# Patient Record
Sex: MALE | Race: WHITE | NOT HISPANIC OR LATINO | Employment: FULL TIME | URBAN - METROPOLITAN AREA
[De-identification: names, ages, dates, MRNs, and addresses within clinical notes are randomized per-mention and may not be internally consistent; named-entity substitution may affect disease eponyms.]

---

## 2019-10-29 ENCOUNTER — OFFICE VISIT (OUTPATIENT)
Dept: MEDICAL GROUP | Facility: MEDICAL CENTER | Age: 54
End: 2019-10-29
Payer: COMMERCIAL

## 2019-10-29 VITALS
BODY MASS INDEX: 33.62 KG/M2 | WEIGHT: 262 LBS | SYSTOLIC BLOOD PRESSURE: 126 MMHG | HEART RATE: 98 BPM | DIASTOLIC BLOOD PRESSURE: 68 MMHG | OXYGEN SATURATION: 94 % | TEMPERATURE: 98.2 F | RESPIRATION RATE: 16 BRPM | HEIGHT: 74 IN

## 2019-10-29 DIAGNOSIS — R60.0 LEG EDEMA, RIGHT: ICD-10-CM

## 2019-10-29 DIAGNOSIS — M25.561 CHRONIC PAIN OF RIGHT KNEE: ICD-10-CM

## 2019-10-29 DIAGNOSIS — E78.1 HYPERTRIGLYCERIDEMIA: ICD-10-CM

## 2019-10-29 DIAGNOSIS — E66.9 OBESITY (BMI 30-39.9): ICD-10-CM

## 2019-10-29 DIAGNOSIS — Z00.00 PREVENTATIVE HEALTH CARE: ICD-10-CM

## 2019-10-29 DIAGNOSIS — K21.9 GASTROESOPHAGEAL REFLUX DISEASE, ESOPHAGITIS PRESENCE NOT SPECIFIED: ICD-10-CM

## 2019-10-29 DIAGNOSIS — Z76.89 ENCOUNTER TO ESTABLISH CARE: ICD-10-CM

## 2019-10-29 DIAGNOSIS — G89.29 CHRONIC PAIN OF RIGHT KNEE: ICD-10-CM

## 2019-10-29 DIAGNOSIS — J30.2 SEASONAL ALLERGIC RHINITIS, UNSPECIFIED TRIGGER: ICD-10-CM

## 2019-10-29 PROBLEM — I10 ESSENTIAL HYPERTENSION: Status: ACTIVE | Noted: 2019-10-29

## 2019-10-29 PROCEDURE — 99204 OFFICE O/P NEW MOD 45 MIN: CPT | Performed by: PHYSICIAN ASSISTANT

## 2019-10-29 RX ORDER — AZELASTINE HCL 205.5 UG/1
SPRAY NASAL
COMMUNITY
Start: 2019-08-28 | End: 2019-10-29

## 2019-10-29 ASSESSMENT — PATIENT HEALTH QUESTIONNAIRE - PHQ9: CLINICAL INTERPRETATION OF PHQ2 SCORE: 0
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
## 2019-10-29 NOTE — LETTER
[employer] [facility]  Miguel Wisdom P.A.-C.  [address]  [place] [address]  Fax: [phone]   Authorization for Release/Disclosure of   Protected Health Information   Name: TONIA UMANZOR : 1965 SSN: [id number]   Address: [address] [place] [id number] Phone:    [phone] (home)    I authorize the entity listed below to release/disclose the PHI below to:   [facility]/Miguel Wisdom P.A.-C. and Miguel Wisdom P.A.-C.   Provider or Entity Name:  [facility]   Address   City, State, Zip   Phone:      Fax:     Reason for request: continuity of care   Information to be released:    [  ] LAST COLONOSCOPY,  including any PATH REPORT and follow-up  [  ] LAST FIT/COLOGUARD RESULT [  ] LAST DEXA  [  ] LAST MAMMOGRAM  [  ] LAST PAP  [ X ] LAST LABS [  ] RETINA EXAM REPORT  [  ] IMMUNIZATION RECORDS  [  ] Release all info      [  ] Check here and initial the line next to each item to release ALL health information INCLUDING  _____ Care and treatment for drug and / or alcohol abuse  _____ HIV testing, infection status, or AIDS  _____ Genetic Testing    DATES OF SERVICE OR TIME PERIOD TO BE DISCLOSED: _____________  I understand and acknowledge that:  * This Authorization may be revoked at any time by you in writing, except if your health information has already been used or disclosed.  * Your health information that will be used or disclosed as a result of you signing this authorization could be re-disclosed by the recipient. If this occurs, your re-disclosed health information may no longer be protected by State or Federal laws.  * You may refuse to sign this Authorization. Your refusal will not affect your ability to obtain treatment.  * This Authorization becomes effective upon signing and will  on (date) __________.      If no date is indicated, this Authorization will  one (1) year from the signature date.    Name: Tonia Umanzor    Signature:   Date:          10/29/2019       PLEASE FAX REQUESTED RECORDS BACK TO: [phone]

## 2019-11-18 RX ORDER — VALACYCLOVIR HYDROCHLORIDE 1 G/1
TABLET, FILM COATED ORAL
Qty: 30 TAB | Refills: 3 | Status: SHIPPED | OUTPATIENT
Start: 2019-11-18 | End: 2019-11-20 | Stop reason: SDUPTHER

## 2019-11-20 ENCOUNTER — PATIENT MESSAGE (OUTPATIENT)
Dept: MEDICAL GROUP | Facility: MEDICAL CENTER | Age: 54
End: 2019-11-20

## 2019-11-20 RX ORDER — VALACYCLOVIR HYDROCHLORIDE 1 G/1
1000 TABLET, FILM COATED ORAL DAILY
Qty: 90 TAB | Refills: 2 | Status: SHIPPED | OUTPATIENT
Start: 2019-11-20 | End: 2019-11-22 | Stop reason: SDUPTHER

## 2019-11-20 NOTE — TELEPHONE ENCOUNTER
From: Kofi Wall  To: Miguel Wisdom P.A.-C.  Sent: 11/20/2019 8:31 AM PST  Subject: Prescription Question    Is it possible to change prescription for valacyclovir to a 90 day rather than 30 day insurance will only allow to be filled 90 thanks Kel

## 2019-11-21 ENCOUNTER — PATIENT MESSAGE (OUTPATIENT)
Dept: MEDICAL GROUP | Facility: MEDICAL CENTER | Age: 54
End: 2019-11-21

## 2019-11-21 NOTE — TELEPHONE ENCOUNTER
From: Kofi Wall  To: Miguel Wisdom P.A.-C.  Sent: 11/21/2019 8:31 AM PST  Subject: Prescription Question    Did you get mess. Yesterday for a 90 day prescription of Vacyclovir , new ins . Company's mail order won't fill 30 day supplies. Send to [facility] . Same place as others [phone] Thanks Kel

## 2019-11-22 RX ORDER — VALACYCLOVIR HYDROCHLORIDE 1 G/1
1000 TABLET, FILM COATED ORAL DAILY
Qty: 90 TAB | Refills: 2 | Status: CANCELLED | OUTPATIENT
Start: 2019-11-22

## 2019-11-24 RX ORDER — VALACYCLOVIR HYDROCHLORIDE 1 G/1
1000 TABLET, FILM COATED ORAL DAILY
Qty: 90 TAB | Refills: 2 | Status: SHIPPED | OUTPATIENT
Start: 2019-11-24 | End: 2019-11-26 | Stop reason: SDUPTHER

## 2019-11-26 RX ORDER — VALACYCLOVIR HYDROCHLORIDE 1 G/1
1000 TABLET, FILM COATED ORAL DAILY
Qty: 90 TAB | Refills: 2 | Status: SHIPPED | OUTPATIENT
Start: 2019-11-26 | End: 2020-08-13 | Stop reason: SDUPTHER

## 2020-02-10 DIAGNOSIS — E78.1 HYPERTRIGLYCERIDEMIA: ICD-10-CM

## 2020-02-10 RX ORDER — FENOFIBRATE 145 MG/1
145 TABLET, COATED ORAL DAILY
Qty: 90 TAB | Refills: 1 | Status: SHIPPED | OUTPATIENT
Start: 2020-02-10 | End: 2020-07-06 | Stop reason: SDUPTHER

## 2020-04-28 ENCOUNTER — APPOINTMENT (OUTPATIENT)
Dept: MEDICAL GROUP | Facility: MEDICAL CENTER | Age: 55
End: 2020-04-28
Payer: COMMERCIAL

## 2020-06-01 DIAGNOSIS — J30.2 SEASONAL ALLERGIC RHINITIS, UNSPECIFIED TRIGGER: ICD-10-CM

## 2020-06-01 RX ORDER — AZELASTINE 1 MG/ML
1 SPRAY, METERED NASAL 2 TIMES DAILY
Qty: 3 BOTTLE | Refills: 2 | Status: SHIPPED | OUTPATIENT
Start: 2020-06-01 | End: 2020-08-13 | Stop reason: SDUPTHER

## 2020-07-06 ENCOUNTER — PATIENT MESSAGE (OUTPATIENT)
Dept: MEDICAL GROUP | Facility: MEDICAL CENTER | Age: 55
End: 2020-07-06

## 2020-07-06 DIAGNOSIS — E78.1 HYPERTRIGLYCERIDEMIA: ICD-10-CM

## 2020-07-06 RX ORDER — TRIAMTERENE AND HYDROCHLOROTHIAZIDE 37.5; 25 MG/1; MG/1
1 TABLET ORAL DAILY
Qty: 90 TAB | Refills: 1 | Status: SHIPPED | OUTPATIENT
Start: 2020-07-06 | End: 2020-08-13 | Stop reason: SDUPTHER

## 2020-07-06 RX ORDER — FENOFIBRATE 145 MG/1
145 TABLET, COATED ORAL DAILY
Qty: 90 TAB | Refills: 1 | Status: SHIPPED | OUTPATIENT
Start: 2020-07-06 | End: 2020-08-13 | Stop reason: SDUPTHER

## 2020-07-06 RX ORDER — OMEPRAZOLE 20 MG/1
20 CAPSULE, DELAYED RELEASE ORAL DAILY
Qty: 90 CAP | Refills: 1 | Status: SHIPPED | OUTPATIENT
Start: 2020-07-06 | End: 2020-08-13 | Stop reason: SDUPTHER

## 2020-07-30 ENCOUNTER — HOSPITAL ENCOUNTER (OUTPATIENT)
Dept: LAB | Facility: MEDICAL CENTER | Age: 55
End: 2020-07-30
Attending: PHYSICIAN ASSISTANT
Payer: COMMERCIAL

## 2020-07-30 DIAGNOSIS — E78.1 HYPERTRIGLYCERIDEMIA: ICD-10-CM

## 2020-07-30 LAB
CHOLEST SERPL-MCNC: 181 MG/DL (ref 100–199)
FASTING STATUS PATIENT QL REPORTED: NORMAL
HDLC SERPL-MCNC: 30 MG/DL
LDLC SERPL CALC-MCNC: 125 MG/DL
TRIGL SERPL-MCNC: 128 MG/DL (ref 0–149)

## 2020-07-30 PROCEDURE — 36415 COLL VENOUS BLD VENIPUNCTURE: CPT

## 2020-07-30 PROCEDURE — 80061 LIPID PANEL: CPT

## 2020-08-05 ENCOUNTER — TELEPHONE (OUTPATIENT)
Dept: HEALTH INFORMATION MANAGEMENT | Facility: OTHER | Age: 55
End: 2020-08-05

## 2020-08-05 DIAGNOSIS — J30.2 SEASONAL ALLERGIC RHINITIS, UNSPECIFIED TRIGGER: ICD-10-CM

## 2020-08-05 NOTE — TELEPHONE ENCOUNTER
Received request via: Patient    Was the patient seen in the last year in this department? Yes    Does the patient have an active prescription (recently filled or refills available) for medication(s) requested? Yes. Refill request has been refused in Epic. Contacted pharmacy and called in most recent prescription.      Please send to new mail order pharmacy, not [facility].

## 2020-08-05 NOTE — TELEPHONE ENCOUNTER
Outcome: Left Voicemail   Need to know Specific Medications for refill. Called and left voicemail to call back at [phone].

## 2020-08-06 ENCOUNTER — TELEPHONE (OUTPATIENT)
Dept: MEDICAL GROUP | Facility: MEDICAL CENTER | Age: 55
End: 2020-08-06

## 2020-08-06 NOTE — TELEPHONE ENCOUNTER
Patient left vmail at PAR phone. He didn't state what he needed, just that he wants a call back from you. Thank you.

## 2020-08-13 ENCOUNTER — OFFICE VISIT (OUTPATIENT)
Dept: MEDICAL GROUP | Facility: PHYSICIAN GROUP | Age: 55
End: 2020-08-13
Payer: COMMERCIAL

## 2020-08-13 VITALS
HEIGHT: 74 IN | WEIGHT: 280 LBS | DIASTOLIC BLOOD PRESSURE: 74 MMHG | RESPIRATION RATE: 16 BRPM | SYSTOLIC BLOOD PRESSURE: 132 MMHG | BODY MASS INDEX: 35.94 KG/M2 | HEART RATE: 88 BPM | OXYGEN SATURATION: 92 % | TEMPERATURE: 97.5 F

## 2020-08-13 DIAGNOSIS — K21.9 GASTROESOPHAGEAL REFLUX DISEASE, ESOPHAGITIS PRESENCE NOT SPECIFIED: ICD-10-CM

## 2020-08-13 DIAGNOSIS — R60.0 LEG EDEMA, RIGHT: ICD-10-CM

## 2020-08-13 DIAGNOSIS — Z76.89 ENCOUNTER TO ESTABLISH CARE: ICD-10-CM

## 2020-08-13 DIAGNOSIS — J30.2 SEASONAL ALLERGIC RHINITIS, UNSPECIFIED TRIGGER: ICD-10-CM

## 2020-08-13 DIAGNOSIS — E78.1 HYPERTRIGLYCERIDEMIA: ICD-10-CM

## 2020-08-13 DIAGNOSIS — Z12.5 SCREENING FOR PROSTATE CANCER: ICD-10-CM

## 2020-08-13 DIAGNOSIS — E66.9 OBESITY (BMI 30-39.9): ICD-10-CM

## 2020-08-13 DIAGNOSIS — R73.09 ELEVATED GLUCOSE LEVEL: ICD-10-CM

## 2020-08-13 DIAGNOSIS — Z00.00 PREVENTATIVE HEALTH CARE: ICD-10-CM

## 2020-08-13 DIAGNOSIS — B00.2 RECURRENT ORAL HERPES SIMPLEX: ICD-10-CM

## 2020-08-13 PROCEDURE — 99214 OFFICE O/P EST MOD 30 MIN: CPT | Performed by: NURSE PRACTITIONER

## 2020-08-13 RX ORDER — OMEPRAZOLE 20 MG/1
20 CAPSULE, DELAYED RELEASE ORAL DAILY
Qty: 90 CAP | Refills: 3 | Status: SHIPPED | OUTPATIENT
Start: 2020-08-13 | End: 2021-07-06 | Stop reason: SDUPTHER

## 2020-08-13 RX ORDER — AZELASTINE 1 MG/ML
2 SPRAY, METERED NASAL 2 TIMES DAILY
Qty: 3 ML | Refills: 2 | Status: SHIPPED | OUTPATIENT
Start: 2020-08-13 | End: 2020-08-13

## 2020-08-13 RX ORDER — VALACYCLOVIR HYDROCHLORIDE 1 G/1
1000 TABLET, FILM COATED ORAL DAILY
Qty: 90 TAB | Refills: 1 | Status: SHIPPED | OUTPATIENT
Start: 2020-08-13 | End: 2021-02-16 | Stop reason: SDUPTHER

## 2020-08-13 RX ORDER — FENOFIBRATE 145 MG/1
145 TABLET, COATED ORAL DAILY
Qty: 90 TAB | Refills: 2 | Status: SHIPPED | OUTPATIENT
Start: 2020-08-13 | End: 2021-04-09 | Stop reason: SDUPTHER

## 2020-08-13 RX ORDER — TRIAMTERENE AND HYDROCHLOROTHIAZIDE 37.5; 25 MG/1; MG/1
1 TABLET ORAL DAILY
Qty: 90 TAB | Refills: 3 | Status: SHIPPED | OUTPATIENT
Start: 2020-08-13 | End: 2021-08-27 | Stop reason: SDUPTHER

## 2020-08-13 RX ORDER — AZELASTINE 1 MG/ML
2 SPRAY, METERED NASAL 2 TIMES DAILY
Qty: 90 ML | Refills: 2 | Status: SHIPPED | OUTPATIENT
Start: 2020-08-13 | End: 2020-09-28 | Stop reason: SDUPTHER

## 2020-08-13 SDOH — HEALTH STABILITY: MENTAL HEALTH: HOW OFTEN DO YOU HAVE A DRINK CONTAINING ALCOHOL?: 4 OR MORE TIMES A WEEK

## 2020-08-13 SDOH — HEALTH STABILITY: MENTAL HEALTH: HOW MANY STANDARD DRINKS CONTAINING ALCOHOL DO YOU HAVE ON A TYPICAL DAY?: 1 OR 2

## 2020-08-13 ASSESSMENT — PATIENT HEALTH QUESTIONNAIRE - PHQ9: CLINICAL INTERPRETATION OF PHQ2 SCORE: 0

## 2020-08-13 NOTE — ASSESSMENT & PLAN NOTE
Chronic medical problem.  He takes valacyclovir 1,000 mg for outbreaks.  He is tolerating the medication.  He would like a medication refill today and sent to his pharmacy on file today.

## 2020-08-13 NOTE — ASSESSMENT & PLAN NOTE
Chronic medical problem.  He is taking fenofibrate 145 mg daily.  He is tolerating the medication.  He had recent labs that he would like to discuss.  He would like a medication refill.  Last lab results:  Results for YAMIL UMANZOR (MRN [id number]) as of 8/13/2020 17:04   Ref. Range 7/30/2020 07:24   Cholesterol,Tot Latest Ref Range: 100 - 199 mg/dL 181   Triglycerides Latest Ref Range: 0 - 149 mg/dL 128   HDL Latest Ref Range: >=40 mg/dL 30 (A)   LDL Latest Ref Range: <100 mg/dL 125 (H)

## 2020-08-13 NOTE — ASSESSMENT & PLAN NOTE
Chronic medical problem.  He is taking triamterene-hctz 37.5-250 mg daily. This does help his leg swelling.  He states that he has tried in the past to stop the medication but without medication his legs swell.  He would like a medication refill today.

## 2020-08-13 NOTE — ASSESSMENT & PLAN NOTE
Chronic medical problem.  He is taking azelastine twice a day.  He would like to clarify the max dose on this medication.  The medication does help his symptoms.  He would like a medication refill today.

## 2020-08-13 NOTE — ASSESSMENT & PLAN NOTE
Chronic medical problem.  He is taking omeprazole 20 mg daily.  The medication is controlling his symptoms.  He denies any heartburn or indigestion today.  He would like a medication refill today.

## 2020-08-13 NOTE — PROGRESS NOTES
Subjective:     CC:  Diagnoses of Encounter to establish care, Hypertriglyceridemia, Leg edema, right, Elevated glucose level, Gastroesophageal reflux disease, esophagitis presence not specified, Recurrent oral herpes simplex, Seasonal allergic rhinitis, unspecified trigger, Obesity (BMI 30-39.9), Preventative health care, and Screening for prostate cancer were pertinent to this visit.    HISTORY OF THE PRESENT ILLNESS: Patient is a 55 y.o. male. This pleasant patient is here today to establish care and discuss the following. His prior PCP was Miguel Wisdom PA-C.    Hypertriglyceridemia  Chronic medical problem.  He is taking fenofibrate 145 mg daily.  He is tolerating the medication.  He had recent labs that he would like to discuss.  He would like a medication refill.  Last lab results:  Results for YAMIL UMANZOR (MRN [id number]) as of 8/13/2020 17:04   Ref. Range 7/30/2020 07:24   Cholesterol,Tot Latest Ref Range: 100 - 199 mg/dL 181   Triglycerides Latest Ref Range: 0 - 149 mg/dL 128   HDL Latest Ref Range: >=40 mg/dL 30 (A)   LDL Latest Ref Range: <100 mg/dL 125 (H)       GERD (gastroesophageal reflux disease)  Chronic medical problem.  He is taking omeprazole 20 mg daily.  The medication is controlling his symptoms.  He denies any heartburn or indigestion today.  He would like a medication refill today.    Leg edema, right  Chronic medical problem.  He is taking triamterene-hctz 37.5-250 mg daily. This does help his leg swelling.  He states that he has tried in the past to stop the medication but without medication his legs swell.  He would like a medication refill today.    Recurrent oral herpes simplex  Chronic medical problem.  He takes valacyclovir 1,000 mg for outbreaks.  He is tolerating the medication.  He would like a medication refill today and sent to his pharmacy on file today.    Allergic rhinitis, seasonal  Chronic medical problem.  He is taking azelastine twice a day.  He would like to  clarify the max dose on this medication.  The medication does help his symptoms.  He would like a medication refill today.    Obesity (BMI 30-39.9)  Chronic medical problem.  His BMI today is 35.95.  His previous BMI was 33.64.    Elevated glucose level  Chronic medical problem.  He had lab work from [place] from 5/2019.  On review of the records his fasting glucose was 133 and his A1c was 6.0%.  He would like labs ordered.      Allergies: Oxycodone    Current Outpatient Medications Ordered in Epic   Medication Sig Dispense Refill   • azelastine (ASTELIN) 137 MCG/SPRAY nasal spray [place] 2 Sprays in nose 2 times a day. 3 mL 2   • fenofibrate (TRICOR) 145 MG Tab Take 1 Tab by mouth every day. 90 Tab 2   • omeprazole (PRILOSEC) 20 MG delayed-release capsule Take 1 Cap by mouth every day. 90 Cap 3   • triamterene-hctz (MAXZIDE-25/DYAZIDE) 37.5-25 MG Tab Take 1 Tab by mouth every day. 90 Tab 3   • valacyclovir (VALTREX) 1 GM Tab Take 1 Tab by mouth every day. 90 Tab 1     No current Epic-ordered facility-administered medications on file.        Past Medical History:   Diagnosis Date   • Allergy    • GERD (gastroesophageal reflux disease)    • Hyperlipidemia        History reviewed. No pertinent surgical history.    Social History     Tobacco Use   • Smoking status: Never Smoker   • Smokeless tobacco: Never Used   Substance Use Topics   • Alcohol use: Not Currently     Alcohol/week: 1.2 oz     Types: 2 Cans of beer per week     Frequency: 4 or more times a week     Drinks per session: 1 or 2     Comment: 1-1.5 beers daily   • Drug use: Never       Social History     Social History Narrative   • Not on file       Family History   Problem Relation Age of Onset   • No Known Problems Mother    • Rheumatologic Disease Father         RA   • No Known Problems Sister    • No Known Problems Brother    • No Known Problems Sister    • No Known Problems Sister    • No Known Problems Sister    • No Known Problems Sister    • No  "Known Problems Daughter    • No Known Problems Daughter    • No Known Problems Son    • No Known Problems Son    • No Known Problems Son        Health Maintenance: Due for zoster vaccine, Tdap, and hepatitis C screening.  He declines Tdap and hepatitis C screen today.    ROS:   Gen: no fevers/chills, no changes in weight  Eyes: no changes in vision  ENT: no sore throat, no ear pain, + itchy ears, + chronic watery eyes  Pulm: no sob, no cough  CV: no chest pain, no palpitations, + chronic leg swelling (he is on medication)  GI: no nausea/vomiting, no diarrhea, no heartburn, no indigestion  : no dysuria  MSk: no myalgias  Skin: no rash  Neuro: no headaches, no dizziness    Objective:     Vital signs reviewed  Exam: /74 (BP Location: Left arm, Patient Position: Sitting, BP Cuff Size: Large adult)   Pulse 88   Temp 36.4 °C (97.5 °F) (Temporal)   Resp 16   Ht 1.88 m (6' 2\")   Wt (!) 127 kg (280 lb)   SpO2 92%  Body mass index is 35.95 kg/m².    General: Normal appearing. No distress.  HENT: Normocephalic. Ears normal shape and contour, canals are clear bilaterally, tympanic membranes are benign.   Eyes: Eyes conjunctiva clear lids without ptosis, pupils equal and reactive to light accommodation, lids normal.  Neck: Supple without JVD. Thyroid is not enlarged.  Pulmonary: Clear to ausculation.  Normal effort. No rales, ronchi, or wheezing.  Cardiovascular: Regular rate and rhythm without murmur. Radial pulses are intact and equal bilaterally.  Abdomen: Soft, nontender, nondistended.   Neurologic: Grossly nonfocal  Lymph: No cervical or supraclavicular lymph nodes are palpable  Skin: Warm and dry.  No obvious lesions.  Musculoskeletal: Normal gait. No extremity cyanosis, clubbing, or edema.  Psych: Normal mood and affect. Alert and oriented x3. Judgment and insight is normal.      Assessment & Plan:   55 y.o. male with the following -    1. Encounter to establish care  New problem to examiner.  Care " established.  [facility] lab records from 5/15/2019 and recent Lipid panel on 7/30/2020 reviewed and discussed with patient.    2. Hypertriglyceridemia  New problem to examiner.  Continue fenofibrate, medication refilled.  We reviewed his labs.  Discussed eating low-fat diet, increasing vegetables and fruits, and exercising 150 minutes a week.  Monitor and follow.  - fenofibrate (TRICOR) 145 MG Tab; Take 1 Tab by mouth every day.  Dispense: 90 Tab; Refill: 2    3. Leg edema, right  New problem to examiner.  Continue triamterene-hctz, medication refilled.  No leg swelling today.  No acute symptoms.  Monitor and follow.  - triamterene-hctz (MAXZIDE-25/DYAZIDE) 37.5-25 MG Tab; Take 1 Tab by mouth every day.  Dispense: 90 Tab; Refill: 3    4. Elevated glucose level  New problem to examiner.  He is due for repeat A1c.  Monitor and follow-up via my chart.  - HEMOGLOBIN A1C; Future    5. Gastroesophageal reflux disease, esophagitis presence not specified  New problem to examiner.  Continue Meprazole, medication refilled.  Continue to avoid diet triggers.  Monitor and follow.  - omeprazole (PRILOSEC) 20 MG delayed-release capsule; Take 1 Cap by mouth every day.  Dispense: 90 Cap; Refill: 3    6. Recurrent oral herpes simplex  New problem to examiner.  Continue valacyclovir.  Continue to avoid triggers.  No acute symptoms today.  Monitor and follow.  - valacyclovir (VALTREX) 1 GM Tab; Take 1 Tab by mouth every day.  Dispense: 90 Tab; Refill: 1    7. Seasonal allergic rhinitis, unspecified trigger  New problem to examiner.  Continues Astelin, medication refill.  Discussed that he may take 2 sprays in each nostril twice a day.  Medication refilled to dispense  - azelastine (ASTELIN) 137 MCG/SPRAY nasal spray; Spray 2 Sprays in nose 2 times a day.  Dispense: 90 mL; Refill: 2    8. Obesity (BMI 30-39.9)  New problem to examiner.  BMI stable.  Continue diet and lifestyle modifications.  Monitor and follow.    9. Preventative  health care  New problem to examiner.  Due for labs.  Orders placed.  Monitor and follow-up via [employer].  - CBC WITH DIFFERENTIAL; Future  - Comp Metabolic Panel; Future    10. Screening for prostate cancer  New problem to examiner.  Screening indicated.  Patient is in agreement.  Orders placed.  Monitor and follow-up via [employer].  - PROSTATE SPECIFIC AG SCREENING; Future      Return in about 1 week (around 8/20/2020) for Labs, wart removal.    Please note that this dictation was created using voice recognition software. I have made every reasonable attempt to correct obvious errors, but I expect that there are errors of grammar and possibly content that I did not discover before finalizing the note.

## 2020-08-14 NOTE — ASSESSMENT & PLAN NOTE
Chronic medical problem.  He had lab work from [place] from 5/2019.  On review of the records his fasting glucose was 133 and his A1c was 6.0%.  He would like labs ordered.

## 2020-08-20 ENCOUNTER — HOSPITAL ENCOUNTER (OUTPATIENT)
Dept: LAB | Facility: MEDICAL CENTER | Age: 55
End: 2020-08-20
Attending: NURSE PRACTITIONER
Payer: COMMERCIAL

## 2020-08-20 DIAGNOSIS — Z12.5 SCREENING FOR PROSTATE CANCER: ICD-10-CM

## 2020-08-20 DIAGNOSIS — R73.09 ELEVATED GLUCOSE LEVEL: ICD-10-CM

## 2020-08-20 DIAGNOSIS — Z00.00 PREVENTATIVE HEALTH CARE: ICD-10-CM

## 2020-08-20 LAB
ALBUMIN SERPL BCP-MCNC: 4.2 G/DL (ref 3.2–4.9)
ALBUMIN/GLOB SERPL: 1.6 G/DL
ALP SERPL-CCNC: 73 U/L (ref 30–99)
ALT SERPL-CCNC: 28 U/L (ref 2–50)
ANION GAP SERPL CALC-SCNC: 10 MMOL/L (ref 7–16)
AST SERPL-CCNC: 19 U/L (ref 12–45)
BASOPHILS # BLD AUTO: 0.9 % (ref 0–1.8)
BASOPHILS # BLD: 0.06 K/UL (ref 0–0.12)
BILIRUB SERPL-MCNC: 0.3 MG/DL (ref 0.1–1.5)
BUN SERPL-MCNC: 16 MG/DL (ref 8–22)
CALCIUM SERPL-MCNC: 9.7 MG/DL (ref 8.5–10.5)
CHLORIDE SERPL-SCNC: 100 MMOL/L (ref 96–112)
CO2 SERPL-SCNC: 27 MMOL/L (ref 20–33)
CREAT SERPL-MCNC: 1.2 MG/DL (ref 0.5–1.4)
EOSINOPHIL # BLD AUTO: 0.29 K/UL (ref 0–0.51)
EOSINOPHIL NFR BLD: 4.2 % (ref 0–6.9)
ERYTHROCYTE [DISTWIDTH] IN BLOOD BY AUTOMATED COUNT: 41.8 FL (ref 35.9–50)
EST. AVERAGE GLUCOSE BLD GHB EST-MCNC: 137 MG/DL
FASTING STATUS PATIENT QL REPORTED: NORMAL
GLOBULIN SER CALC-MCNC: 2.7 G/DL (ref 1.9–3.5)
GLUCOSE SERPL-MCNC: 141 MG/DL (ref 65–99)
HBA1C MFR BLD: 6.4 % (ref 0–5.6)
HCT VFR BLD AUTO: 47.1 % (ref 42–52)
HGB BLD-MCNC: 15.9 G/DL (ref 14–18)
IMM GRANULOCYTES # BLD AUTO: 0.03 K/UL (ref 0–0.11)
IMM GRANULOCYTES NFR BLD AUTO: 0.4 % (ref 0–0.9)
LYMPHOCYTES # BLD AUTO: 1.71 K/UL (ref 1–4.8)
LYMPHOCYTES NFR BLD: 24.5 % (ref 22–41)
MCH RBC QN AUTO: 30.3 PG (ref 27–33)
MCHC RBC AUTO-ENTMCNC: 33.8 G/DL (ref 33.7–35.3)
MCV RBC AUTO: 89.7 FL (ref 81.4–97.8)
MONOCYTES # BLD AUTO: 0.76 K/UL (ref 0–0.85)
MONOCYTES NFR BLD AUTO: 10.9 % (ref 0–13.4)
NEUTROPHILS # BLD AUTO: 4.12 K/UL (ref 1.82–7.42)
NEUTROPHILS NFR BLD: 59.1 % (ref 44–72)
NRBC # BLD AUTO: 0 K/UL
NRBC BLD-RTO: 0 /100 WBC
PLATELET # BLD AUTO: 263 K/UL (ref 164–446)
PMV BLD AUTO: 10 FL (ref 9–12.9)
POTASSIUM SERPL-SCNC: 4.1 MMOL/L (ref 3.6–5.5)
PROT SERPL-MCNC: 6.9 G/DL (ref 6–8.2)
PSA SERPL-MCNC: 0.47 NG/ML (ref 0–4)
RBC # BLD AUTO: 5.25 M/UL (ref 4.7–6.1)
SODIUM SERPL-SCNC: 137 MMOL/L (ref 135–145)
WBC # BLD AUTO: 7 K/UL (ref 4.8–10.8)

## 2020-08-20 PROCEDURE — 84153 ASSAY OF PSA TOTAL: CPT

## 2020-08-20 PROCEDURE — 83036 HEMOGLOBIN GLYCOSYLATED A1C: CPT

## 2020-08-20 PROCEDURE — 36415 COLL VENOUS BLD VENIPUNCTURE: CPT

## 2020-08-20 PROCEDURE — 80053 COMPREHEN METABOLIC PANEL: CPT

## 2020-08-20 PROCEDURE — 85025 COMPLETE CBC W/AUTO DIFF WBC: CPT

## 2020-08-27 ENCOUNTER — OFFICE VISIT (OUTPATIENT)
Dept: MEDICAL GROUP | Facility: PHYSICIAN GROUP | Age: 55
End: 2020-08-27
Payer: COMMERCIAL

## 2020-08-27 VITALS
RESPIRATION RATE: 16 BRPM | TEMPERATURE: 97.4 F | WEIGHT: 280 LBS | HEIGHT: 74 IN | HEART RATE: 94 BPM | OXYGEN SATURATION: 93 % | SYSTOLIC BLOOD PRESSURE: 128 MMHG | BODY MASS INDEX: 35.94 KG/M2 | DIASTOLIC BLOOD PRESSURE: 80 MMHG

## 2020-08-27 DIAGNOSIS — B07.8 OTHER VIRAL WARTS: ICD-10-CM

## 2020-08-27 DIAGNOSIS — R73.09 ELEVATED GLUCOSE LEVEL: ICD-10-CM

## 2020-08-27 DIAGNOSIS — R73.09 ELEVATED HEMOGLOBIN A1C: ICD-10-CM

## 2020-08-27 PROBLEM — B07.9 VIRAL WARTS: Status: ACTIVE | Noted: 2020-08-27

## 2020-08-27 PROCEDURE — 99213 OFFICE O/P EST LOW 20 MIN: CPT | Mod: 25 | Performed by: NURSE PRACTITIONER

## 2020-08-27 PROCEDURE — 17110 DESTRUCTION B9 LES UP TO 14: CPT | Performed by: NURSE PRACTITIONER

## 2020-08-27 ASSESSMENT — FIBROSIS 4 INDEX: FIB4 SCORE: 0.76

## 2020-08-27 NOTE — ASSESSMENT & PLAN NOTE
New problem to examiner.  Patient has history of ongoing warts for years.  Patient has several warts to his left middle finger that he would like to have removed today.  He has tried home over-the-counter cryotherapy without relief.  He denies any fever, chills, nausea, vomiting, chest pain, shortness of breath today.

## 2020-08-27 NOTE — ASSESSMENT & PLAN NOTE
Chronic medical problem.  He had recent labs that he would like to review.  A typical meal for him:  Breakfast: banana, pretzels, granola bar, eggs 3 days a week  Lunch: cheeseburger, fast food for lunch. Rare soda, drinks mostly water.   Dinner: pasta, potatoes, chicken, pork, rare red meat.  He is not exercising regularly. He does have bowflex, exercise bike, and treadmill at home. He used to ride exercise bike 5 miles before work.   Last lab results:  Results for YAMIL UMANZOR (MRN [id number]) as of 8/27/2020 14:48   Ref. Range 8/20/2020 06:54   Glycohemoglobin Latest Ref Range: 0.0 - 5.6 % 6.4 (H)

## 2020-08-27 NOTE — PROGRESS NOTES
Subjective:     CC: lab results and wart removal    HPI:   Kofi presents today with the following:       Elevated hemoglobin A1c  Chronic medical problem.  He had recent labs that he would like to review.  A typical meal for him:  Breakfast: banana, pretzels, granola bar, eggs 3 days a week  Lunch: cheeseburger, fast food for lunch. Rare soda, drinks mostly water.   Dinner: pasta, potatoes, chicken, pork, rare red meat.  He is not exercising regularly. He does have bowflex, exercise bike, and treadmill at home. He used to ride exercise bike 5 miles before work.   Last lab results:  Results for YAMIL UMANZOR (MRN [id number]) as of 8/27/2020 14:48   Ref. Range 8/20/2020 06:54   Glycohemoglobin Latest Ref Range: 0.0 - 5.6 % 6.4 (H)       Viral warts  New problem to examiner.  Patient has history of ongoing warts for years.  Patient has several warts to his left middle finger that he would like to have removed today.  He has tried home over-the-counter cryotherapy without relief.  He denies any fever, chills, nausea, vomiting, chest pain, shortness of breath today.    Elevated glucose level  Chronic medical problem.  He had recent labs that he would like to review.  Last lab results:  Results for YAMIL UMANZOR (MRN [id number]) as of 8/27/2020 14:48   Ref. Range 8/20/2020 06:54   Glucose Latest Ref Range: 65 - 99 mg/dL 141 (H)       Past Medical History:   Diagnosis Date   • Allergy    • GERD (gastroesophageal reflux disease)    • Hyperlipidemia        Social History     Tobacco Use   • Smoking status: Never Smoker   • Smokeless tobacco: Never Used   Substance Use Topics   • Alcohol use: Not Currently     Alcohol/week: 1.2 oz     Types: 2 Cans of beer per week     Frequency: 4 or more times a week     Drinks per session: 1 or 2     Comment: 1-1.5 beers daily   • Drug use: Never       Current Outpatient Medications Ordered in Epic   Medication Sig Dispense Refill   • fenofibrate (TRICOR) 145 MG Tab Take 1  "Tab by mouth every day. 90 Tab 2   • omeprazole (PRILOSEC) 20 MG delayed-release capsule Take 1 Cap by mouth every day. 90 Cap 3   • triamterene-hctz (MAXZIDE-25/DYAZIDE) 37.5-25 MG Tab Take 1 Tab by mouth every day. 90 Tab 3   • valacyclovir (VALTREX) 1 GM Tab Take 1 Tab by mouth every day. 90 Tab 1   • azelastine (ASTELIN) 137 MCG/SPRAY nasal spray [place] 2 Sprays in nose 2 times a day. 90 mL 2     No current Epic-ordered facility-administered medications on file.        Allergies:  Oxycodone    Health Maintenance: Due for zoster vaccine     ROS:  Gen: no fevers/chills, no changes in weight  Eyes: no changes in vision  ENT: no sore throat  Pulm: no sob, no cough  CV: no chest pain, no palpitations  GI: no nausea/vomiting  : no dysuria  MSk: no myalgias  Skin: no rash, +left hand warts  Neuro: no headaches, no dizziness    Objective:     Vital signs reviewed  Exam:  /80 (BP Location: Left arm, Patient Position: Sitting, BP Cuff Size: Large adult)   Pulse 94   Temp 36.3 °C (97.4 °F) (Temporal)   Resp 16   Ht 1.88 m (6' 2\")   Wt (!) 127 kg (280 lb)   SpO2 93%   BMI 35.95 kg/m²  Body mass index is 35.95 kg/m².    Gen: Alert and oriented, No apparent distress.  Neck: Neck is supple without lymphadenopathy.  Lungs: Normal effort, CTA bilaterally, no wheezes, rhonchi, or rales  CV: Regular rate and rhythm. No murmurs, rubs, or gallops.  Ext: No clubbing, cyanosis, edema. Left middle finger has 5 warts present.     CRYOTHERAPY:  Discussed risks and benefits of cryotherapy. Patient verbally agreed. 3 applications of cryotherapy were applied to 5 lesion on left hand. Patient tolerated procedure well. Aftercare instructions given.      Assessment & Plan:     55 y.o. male with the following -     1. Other viral warts  New problem to examiner. Cryotherapy discussed with patient he verbalized understanding. 5 small warts removed from left middle finger with cryotherapy. Red flags discussed. Monitor and follow. "     2. Elevated hemoglobin A1c  Chronic unstable medical problem. Labs reviewed today with patient from 8/20/2020. Discussed diet and lifestyle modifications. He will start exercising and changing his diet. Repeat A1C in 6 months. Monitor and follow.     3. Elevated glucose level  Chronic unstable medical problem. Continue diet and lifestyle modifications. Repeat A1C in 6 months. Monitor and follow.     Return in about 6 months (around 2/27/2021) for A1C.    Please note that this dictation was created using voice recognition software. I have made every reasonable attempt to correct obvious errors, but I expect that there are errors of grammar and possibly content that I did not discover before finalizing the note.

## 2020-08-27 NOTE — ASSESSMENT & PLAN NOTE
Chronic medical problem.  He had recent labs that he would like to review.  Last lab results:  Results for ERNA YAMIL SERRANO (MRN [id number]) as of 8/27/2020 14:48   Ref. Range 8/20/2020 06:54   Glucose Latest Ref Range: 65 - 99 mg/dL 141 (H)

## 2020-09-28 DIAGNOSIS — J30.2 SEASONAL ALLERGIC RHINITIS, UNSPECIFIED TRIGGER: ICD-10-CM

## 2020-09-28 RX ORDER — AZELASTINE 1 MG/ML
2 SPRAY, METERED NASAL 2 TIMES DAILY
Qty: 120 ML | Refills: 1 | Status: SHIPPED | OUTPATIENT
Start: 2020-09-28 | End: 2021-02-24 | Stop reason: SDUPTHER

## 2020-09-28 NOTE — TELEPHONE ENCOUNTER
Per Pharmacy Current dosage is 75 days need 120 mL for 90 days supply Please change and resend to pharmacy thank you    Received request via: Pharmacy    Was the patient seen in the last year in this department? Yes    Does the patient have an active prescription (recently filled or refills available) for medication(s) requested? No

## 2020-09-29 NOTE — TELEPHONE ENCOUNTER
Requested Prescriptions     Signed Prescriptions Disp Refills   • azelastine (ASTELIN) 137 MCG/SPRAY nasal spray 120 mL 1     Sig: Spray 2 Sprays in nose 2 times a day.     Authorizing Provider: TAYO WERNER A.P.R.N.

## 2021-02-16 DIAGNOSIS — B00.2 RECURRENT ORAL HERPES SIMPLEX: ICD-10-CM

## 2021-02-16 RX ORDER — VALACYCLOVIR HYDROCHLORIDE 1 G/1
1000 TABLET, FILM COATED ORAL DAILY
Qty: 90 TABLET | Refills: 0 | Status: SHIPPED | OUTPATIENT
Start: 2021-02-16 | End: 2021-04-08 | Stop reason: SDUPTHER

## 2021-02-16 NOTE — TELEPHONE ENCOUNTER
Received request via: Pharmacy    Was the patient seen in the last year in this department? Yes LOV 08/27/2020    Does the patient have an active prescription (recently filled or refills available) for medication(s) requested? No

## 2021-02-16 NOTE — TELEPHONE ENCOUNTER
Requested Prescriptions     Signed Prescriptions Disp Refills   • valacyclovir (VALTREX) 1 GM Tab 90 tablet 0     Sig: Take 1 tablet by mouth every day.     Authorizing Provider: JOSELO RAVI A.P.R.N.

## 2021-02-24 DIAGNOSIS — J30.2 SEASONAL ALLERGIC RHINITIS, UNSPECIFIED TRIGGER: ICD-10-CM

## 2021-02-24 RX ORDER — AZELASTINE 1 MG/ML
2 SPRAY, METERED NASAL 2 TIMES DAILY
Qty: 120 ML | Refills: 2 | Status: SHIPPED | OUTPATIENT
Start: 2021-02-24 | End: 2021-10-19 | Stop reason: SDUPTHER

## 2021-02-25 NOTE — TELEPHONE ENCOUNTER
Requested Prescriptions     Signed Prescriptions Disp Refills   • azelastine (ASTELIN) 137 MCG/SPRAY nasal spray 120 mL 2     Sig: Administer 2 Sprays into affected nostril(S) 2 times a day.     Authorizing Provider: TAYO WERNER A.P.R.N.

## 2021-03-15 DIAGNOSIS — Z23 NEED FOR VACCINATION: ICD-10-CM

## 2021-04-08 DIAGNOSIS — B00.2 RECURRENT ORAL HERPES SIMPLEX: ICD-10-CM

## 2021-04-08 RX ORDER — VALACYCLOVIR HYDROCHLORIDE 1 G/1
1000 TABLET, FILM COATED ORAL DAILY
Qty: 90 TABLET | Refills: 2 | Status: SHIPPED | OUTPATIENT
Start: 2021-04-08 | End: 2021-12-17 | Stop reason: SDUPTHER

## 2021-04-08 NOTE — TELEPHONE ENCOUNTER
Requested Prescriptions     Signed Prescriptions Disp Refills   • valacyclovir (VALTREX) 1 GM Tab 90 tablet 2     Sig: Take 1 tablet by mouth every day.     Authorizing Provider: TAYO WERNER A.P.R.N.

## 2021-04-08 NOTE — TELEPHONE ENCOUNTER
Received request via: Pharmacy    Was the patient seen in the last year in this department? Yes    Does the patient have an active prescription (recently filled or refills available) for medication(s) requested? DUE 5/16/21 [facility] REQUESTING

## 2021-04-09 DIAGNOSIS — E78.1 HYPERTRIGLYCERIDEMIA: ICD-10-CM

## 2021-04-09 RX ORDER — FENOFIBRATE 145 MG/1
145 TABLET, COATED ORAL DAILY
Qty: 90 TABLET | Refills: 2 | Status: SHIPPED | OUTPATIENT
Start: 2021-04-09 | End: 2021-12-01 | Stop reason: SDUPTHER

## 2021-04-09 NOTE — TELEPHONE ENCOUNTER
Requested Prescriptions     Signed Prescriptions Disp Refills   • fenofibrate (TRICOR) 145 MG Tab 90 tablet 2     Sig: Take 1 tablet by mouth every day.     Authorizing Provider: TAYO WERNER A.P.R.N.

## 2021-06-30 ENCOUNTER — TELEPHONE (OUTPATIENT)
Dept: MEDICAL GROUP | Facility: PHYSICIAN GROUP | Age: 56
End: 2021-06-30

## 2021-06-30 DIAGNOSIS — E78.1 HYPERTRIGLYCERIDEMIA: ICD-10-CM

## 2021-06-30 DIAGNOSIS — R73.09 ELEVATED HEMOGLOBIN A1C: ICD-10-CM

## 2021-06-30 DIAGNOSIS — Z00.00 PREVENTATIVE HEALTH CARE: ICD-10-CM

## 2021-07-06 DIAGNOSIS — K21.9 GASTROESOPHAGEAL REFLUX DISEASE: ICD-10-CM

## 2021-07-06 RX ORDER — OMEPRAZOLE 20 MG/1
20 CAPSULE, DELAYED RELEASE ORAL DAILY
Qty: 90 CAPSULE | Refills: 0 | Status: SHIPPED | OUTPATIENT
Start: 2021-07-06 | End: 2021-09-02 | Stop reason: SDUPTHER

## 2021-07-07 NOTE — TELEPHONE ENCOUNTER
Requested Prescriptions     Pending Prescriptions Disp Refills   • omeprazole (PRILOSEC) 20 MG delayed-release capsule 90 capsule 0     Sig: Take 1 capsule by mouth every day.       Susan Sands A.P.R.N.

## 2021-07-15 ENCOUNTER — HOSPITAL ENCOUNTER (OUTPATIENT)
Dept: LAB | Facility: MEDICAL CENTER | Age: 56
End: 2021-07-15
Attending: NURSE PRACTITIONER
Payer: COMMERCIAL

## 2021-07-15 DIAGNOSIS — Z00.00 PREVENTATIVE HEALTH CARE: ICD-10-CM

## 2021-07-15 DIAGNOSIS — R73.09 ELEVATED HEMOGLOBIN A1C: ICD-10-CM

## 2021-07-15 DIAGNOSIS — E78.1 HYPERTRIGLYCERIDEMIA: ICD-10-CM

## 2021-07-15 LAB
ALBUMIN SERPL BCP-MCNC: 4.3 G/DL (ref 3.2–4.9)
ALBUMIN/GLOB SERPL: 1.5 G/DL
ALP SERPL-CCNC: 78 U/L (ref 30–99)
ALT SERPL-CCNC: 34 U/L (ref 2–50)
ANION GAP SERPL CALC-SCNC: 12 MMOL/L (ref 7–16)
AST SERPL-CCNC: 28 U/L (ref 12–45)
BASOPHILS # BLD AUTO: 0.8 % (ref 0–1.8)
BASOPHILS # BLD: 0.06 K/UL (ref 0–0.12)
BILIRUB SERPL-MCNC: 0.3 MG/DL (ref 0.1–1.5)
BUN SERPL-MCNC: 16 MG/DL (ref 8–22)
CALCIUM SERPL-MCNC: 9.7 MG/DL (ref 8.5–10.5)
CHLORIDE SERPL-SCNC: 106 MMOL/L (ref 96–112)
CHOLEST SERPL-MCNC: 161 MG/DL (ref 100–199)
CO2 SERPL-SCNC: 25 MMOL/L (ref 20–33)
CREAT SERPL-MCNC: 1.22 MG/DL (ref 0.5–1.4)
EOSINOPHIL # BLD AUTO: 0.29 K/UL (ref 0–0.51)
EOSINOPHIL NFR BLD: 3.8 % (ref 0–6.9)
ERYTHROCYTE [DISTWIDTH] IN BLOOD BY AUTOMATED COUNT: 44.3 FL (ref 35.9–50)
EST. AVERAGE GLUCOSE BLD GHB EST-MCNC: 146 MG/DL
FASTING STATUS PATIENT QL REPORTED: NORMAL
GLOBULIN SER CALC-MCNC: 2.8 G/DL (ref 1.9–3.5)
GLUCOSE SERPL-MCNC: 141 MG/DL (ref 65–99)
HBA1C MFR BLD: 6.7 % (ref 4–5.6)
HCT VFR BLD AUTO: 47.4 % (ref 42–52)
HDLC SERPL-MCNC: 26 MG/DL
HGB BLD-MCNC: 15.5 G/DL (ref 14–18)
IMM GRANULOCYTES # BLD AUTO: 0.04 K/UL (ref 0–0.11)
IMM GRANULOCYTES NFR BLD AUTO: 0.5 % (ref 0–0.9)
LDLC SERPL CALC-MCNC: 102 MG/DL
LYMPHOCYTES # BLD AUTO: 1.88 K/UL (ref 1–4.8)
LYMPHOCYTES NFR BLD: 24.4 % (ref 22–41)
MCH RBC QN AUTO: 30 PG (ref 27–33)
MCHC RBC AUTO-ENTMCNC: 32.7 G/DL (ref 33.7–35.3)
MCV RBC AUTO: 91.9 FL (ref 81.4–97.8)
MONOCYTES # BLD AUTO: 0.85 K/UL (ref 0–0.85)
MONOCYTES NFR BLD AUTO: 11 % (ref 0–13.4)
NEUTROPHILS # BLD AUTO: 4.59 K/UL (ref 1.82–7.42)
NEUTROPHILS NFR BLD: 59.5 % (ref 44–72)
NRBC # BLD AUTO: 0 K/UL
NRBC BLD-RTO: 0 /100 WBC
PLATELET # BLD AUTO: 262 K/UL (ref 164–446)
PMV BLD AUTO: 10.4 FL (ref 9–12.9)
POTASSIUM SERPL-SCNC: 4 MMOL/L (ref 3.6–5.5)
PROT SERPL-MCNC: 7.1 G/DL (ref 6–8.2)
RBC # BLD AUTO: 5.16 M/UL (ref 4.7–6.1)
SODIUM SERPL-SCNC: 143 MMOL/L (ref 135–145)
TRIGL SERPL-MCNC: 163 MG/DL (ref 0–149)
WBC # BLD AUTO: 7.7 K/UL (ref 4.8–10.8)

## 2021-07-15 PROCEDURE — 80061 LIPID PANEL: CPT

## 2021-07-15 PROCEDURE — 36415 COLL VENOUS BLD VENIPUNCTURE: CPT

## 2021-07-15 PROCEDURE — 85025 COMPLETE CBC W/AUTO DIFF WBC: CPT

## 2021-07-15 PROCEDURE — 83036 HEMOGLOBIN GLYCOSYLATED A1C: CPT

## 2021-07-15 PROCEDURE — 80053 COMPREHEN METABOLIC PANEL: CPT

## 2021-07-28 ENCOUNTER — OFFICE VISIT (OUTPATIENT)
Dept: MEDICAL GROUP | Facility: PHYSICIAN GROUP | Age: 56
End: 2021-07-28
Payer: COMMERCIAL

## 2021-07-28 VITALS
TEMPERATURE: 97.8 F | RESPIRATION RATE: 18 BRPM | DIASTOLIC BLOOD PRESSURE: 78 MMHG | BODY MASS INDEX: 35.29 KG/M2 | OXYGEN SATURATION: 94 % | HEIGHT: 74 IN | HEART RATE: 90 BPM | WEIGHT: 275 LBS | SYSTOLIC BLOOD PRESSURE: 128 MMHG

## 2021-07-28 DIAGNOSIS — E66.9 OBESITY (BMI 30-39.9): ICD-10-CM

## 2021-07-28 DIAGNOSIS — B07.8 OTHER VIRAL WARTS: ICD-10-CM

## 2021-07-28 DIAGNOSIS — E11.9 TYPE 2 DIABETES MELLITUS WITHOUT COMPLICATION, WITHOUT LONG-TERM CURRENT USE OF INSULIN (HCC): ICD-10-CM

## 2021-07-28 DIAGNOSIS — R60.0 LEG EDEMA, RIGHT: ICD-10-CM

## 2021-07-28 DIAGNOSIS — E78.1 HYPERTRIGLYCERIDEMIA: ICD-10-CM

## 2021-07-28 PROBLEM — E11.65 TYPE 2 DIABETES MELLITUS WITH HYPERGLYCEMIA, WITHOUT LONG-TERM CURRENT USE OF INSULIN (HCC): Status: ACTIVE | Noted: 2021-07-28

## 2021-07-28 PROBLEM — R73.09 ELEVATED HEMOGLOBIN A1C: Status: RESOLVED | Noted: 2020-08-27 | Resolved: 2021-07-28

## 2021-07-28 PROBLEM — R73.09 ELEVATED GLUCOSE LEVEL: Status: RESOLVED | Noted: 2020-08-13 | Resolved: 2021-07-28

## 2021-07-28 PROCEDURE — 99214 OFFICE O/P EST MOD 30 MIN: CPT | Mod: 25 | Performed by: NURSE PRACTITIONER

## 2021-07-28 PROCEDURE — 17110 DESTRUCTION B9 LES UP TO 14: CPT | Performed by: NURSE PRACTITIONER

## 2021-07-28 ASSESSMENT — PATIENT HEALTH QUESTIONNAIRE - PHQ9: CLINICAL INTERPRETATION OF PHQ2 SCORE: 0

## 2021-07-28 ASSESSMENT — FIBROSIS 4 INDEX: FIB4 SCORE: 1.04

## 2021-07-28 NOTE — ASSESSMENT & PLAN NOTE
Chronic medical problem. He continues to have warts to right index finger and left index finger.  He would like to have them frozen off today.

## 2021-07-28 NOTE — PROGRESS NOTES
Subjective:     CC: warts     HPI:   Kofi presents today with the following:     Viral warts  Chronic medical problem. He continues to have warts to right index finger and left index finger.  He would like to have them frozen off today.    Type 2 diabetes mellitus with hyperglycemia, without long-term current use of insulin (HCC)  Acute medical problem.  He had recent labs that he would like to review today.  He eats lots of pasta and is not exercising.  Last lab results:  Results for YAMIL UMANZOR (MRN [id number]) as of 7/28/2021 16:03   Ref. Range 7/15/2021 06:54   Glycohemoglobin Latest Ref Range: 4.0 - 5.6 % 6.7 (H)   Estim. Avg Glu Latest Units: mg/dL 146   Fasting Status Unknown Fasting       Leg edema, right  Chronic medical problem.  He is taking triamterene-hctz 37.5-25 mg daily.  He states the medication helps with his leg swelling.  His initial blood pressure today was 140/76.  He denies any chest pain, shortness of breath, blurry vision, or headaches.    Hypertriglyceridemia  Chronic medical problem.  He is taking fenofibrate 145 mg daily.  He is not exercising.  He states that he eats lots of pasta.  He states that he has been on vacation recently.  He had recent labs that he would like to review today.  Last lab results:  Results for YAMIL UMANZOR (MRN [id number]) as of 7/28/2021 16:03   Ref. Range 7/15/2021 06:54   Cholesterol,Tot Latest Ref Range: 100 - 199 mg/dL 161   Triglycerides Latest Ref Range: 0 - 149 mg/dL 163 (H)   HDL Latest Ref Range: >=40 mg/dL 26 (A)   LDL Latest Ref Range: <100 mg/dL 102 (H)       Past Medical History:   Diagnosis Date   • Allergy    • GERD (gastroesophageal reflux disease)    • Hyperlipidemia        Social History     Tobacco Use   • Smoking status: Never Smoker   • Smokeless tobacco: Never Used   Vaping Use   • Vaping Use: Never used   Substance Use Topics   • Alcohol use: Not Currently     Alcohol/week: 1.2 oz     Types: 2 Cans of beer per week      "Comment: 1-1.5 beers daily   • Drug use: Never       Current Outpatient Medications Ordered in Epic   Medication Sig Dispense Refill   • omeprazole (PRILOSEC) 20 MG delayed-release capsule Take 1 capsule by mouth every day. 90 capsule 0   • fenofibrate (TRICOR) 145 MG Tab Take 1 tablet by mouth every day. 90 tablet 2   • valacyclovir (VALTREX) 1 GM Tab Take 1 tablet by mouth every day. 90 tablet 2   • azelastine (ASTELIN) 137 MCG/SPRAY nasal spray Administer 2 Sprays into affected nostril(S) 2 times a day. 120 mL 2   • triamterene-hctz (MAXZIDE-25/DYAZIDE) 37.5-25 MG Tab Take 1 Tab by mouth every day. 90 Tab 3     No current Epic-ordered facility-administered medications on file.       Allergies:  Oxycodone      ROS:  Gen: no fevers/chills, no changes in weight  Eyes: no changes in vision  Pulm: no shortness of breath  CV: no chest pain, no palpitations  GI: no nausea/vomiting,  MSk: no myalgias  Skin: no rash, + wart to right index finger and left index finger   Neuro: no headaches, no dizziness      Objective:     Vital signs reviewed   Exam:  /78 (BP Location: Right arm, Patient Position: Sitting)   Pulse 90   Temp 36.6 °C (97.8 °F) (Temporal)   Resp 18   Ht 1.88 m (6' 2\")   Wt 125 kg (275 lb)   SpO2 94%   BMI 35.31 kg/m²  Body mass index is 35.31 kg/m².    Gen: Alert and oriented, No apparent distress.  Neck: Neck is supple without lymphadenopathy.  Lungs: Normal effort, CTA bilaterally, no wheezes, rhonchi, or rales  CV: Regular rate and rhythm. No murmurs, rubs, or gallops.  Ext: No clubbing, cyanosis, edema. + wart to right index finger and left index finger     CRYOTHERAPY:  Discussed risks and benefits of cryotherapy. Patient verbally agreed. 3 applications of cryotherapy were applied to 3 lesion in total. 1 lesion to left index finger.  2 lesions to right index finger. Patient tolerated procedure well. Aftercare instructions given.      Assessment & Plan:     56 y.o. male with the following - "     1. Type 2 diabetes mellitus without complication, without long-term current use of insulin (HCC)  Acute uncomplicated problem.  New diagnosis today.  We discussed starting with diet and lifestyle modifications.  Referral placed to [facility] diabetes education today.  He will be due for updated A1c in 3 months.  Orders placed.  We discussed briefly pathophysiology of diabetes.  - REFERRAL TO OTHER  - HEMOGLOBIN A1C; Future    2. Hypertriglyceridemia  Chronic exacerbated problem.  He will continue with his fenofibrate.  We discussed reinforcing diet lifestyle modifications.  He will be due for updated labs in 3 months.  Orders placed.  - Lipid Profile; Future    3. Other viral warts  Chronic exacerbated problem.  Cryotherapy applied today, see above.  Patient tolerated procedure well today.    4. Leg edema, right  Chronic stable problem.  He will continue with his triamterene-hydrochlorothiazide.  No acute complaints today.  Discussed reviewed his recent labs.    5. Obesity (BMI 30-39.9)  Chronic stable problem.  He is interested in weight loss.  Referral placed to Dr. Galaviz's weight loss program.  He initially requested prescription for phentermine.  Discussed with patient that I would like him to start with diet last modifications first before prescription medication.  He verbalized understanding.  - REFERRAL TO OTHER      Return in about 3 months (around 10/28/2021) for Labs, Diabetes.    Please note that this dictation was created using voice recognition software. I have made every reasonable attempt to correct obvious errors, but I expect that there are errors of grammar and possibly content that I did not discover before finalizing the note.

## 2021-07-28 NOTE — ASSESSMENT & PLAN NOTE
Chronic medical problem.  He is taking triamterene-hctz 37.5-25 mg daily.  He states the medication helps with his leg swelling.  His initial blood pressure today was 140/76.  He denies any chest pain, shortness of breath, blurry vision, or headaches.

## 2021-07-28 NOTE — ASSESSMENT & PLAN NOTE
Chronic medical problem.  He is taking fenofibrate 145 mg daily.  He is not exercising.  He states that he eats lots of pasta.  He states that he has been on vacation recently.  He had recent labs that he would like to review today.  Last lab results:  Results for YAMIL UMANZOR (MRN [id number]) as of 7/28/2021 16:03   Ref. Range 7/15/2021 06:54   Cholesterol,Tot Latest Ref Range: 100 - 199 mg/dL 161   Triglycerides Latest Ref Range: 0 - 149 mg/dL 163 (H)   HDL Latest Ref Range: >=40 mg/dL 26 (A)   LDL Latest Ref Range: <100 mg/dL 102 (H)

## 2021-07-28 NOTE — ASSESSMENT & PLAN NOTE
Acute medical problem.  He had recent labs that he would like to review today.  He eats lots of pasta and is not exercising.  Last lab results:  Results for ERNA YAMIL SERRANO (MRN [id number]) as of 7/28/2021 16:03   Ref. Range 7/15/2021 06:54   Glycohemoglobin Latest Ref Range: 4.0 - 5.6 % 6.7 (H)   Estim. Avg Glu Latest Units: mg/dL 146   Fasting Status Unknown Fasting

## 2021-08-27 DIAGNOSIS — R60.0 LEG EDEMA, RIGHT: ICD-10-CM

## 2021-08-27 RX ORDER — TRIAMTERENE AND HYDROCHLOROTHIAZIDE 37.5; 25 MG/1; MG/1
1 TABLET ORAL DAILY
Qty: 90 TABLET | Refills: 3 | Status: SHIPPED | OUTPATIENT
Start: 2021-08-27 | End: 2022-08-17 | Stop reason: SDUPTHER

## 2021-08-28 NOTE — TELEPHONE ENCOUNTER
Requested Prescriptions     Signed Prescriptions Disp Refills   • triamterene-hctz (MAXZIDE-25/DYAZIDE) 37.5-25 MG Tab 90 Tablet 3     Sig: Take 1 Tablet by mouth every day.     Authorizing Provider: TAYO WERNER A.P.R.N.

## 2021-09-02 ENCOUNTER — PATIENT MESSAGE (OUTPATIENT)
Dept: MEDICAL GROUP | Facility: PHYSICIAN GROUP | Age: 56
End: 2021-09-02

## 2021-09-02 DIAGNOSIS — K21.9 GASTROESOPHAGEAL REFLUX DISEASE: ICD-10-CM

## 2021-09-02 RX ORDER — OMEPRAZOLE 20 MG/1
20 CAPSULE, DELAYED RELEASE ORAL DAILY
Qty: 90 CAPSULE | Refills: 3 | Status: SHIPPED | OUTPATIENT
Start: 2021-09-02 | End: 2022-08-17 | Stop reason: SDUPTHER

## 2021-09-02 NOTE — PROGRESS NOTES
Requested Prescriptions     Signed Prescriptions Disp Refills   • omeprazole (PRILOSEC) 20 MG delayed-release capsule 90 Capsule 3     Sig: Take 1 Capsule by mouth every day.     Authorizing Provider: TAYO WERNER A.P.R.N.

## 2021-09-02 NOTE — PATIENT COMMUNICATION
Received request via: Patient    Was the patient seen in the last year in this department? Yes    Does the patient have an active prescription (recently filled or refills available) for medication(s) requested? Mail order requesting refill

## 2021-10-19 DIAGNOSIS — J30.2 SEASONAL ALLERGIC RHINITIS, UNSPECIFIED TRIGGER: ICD-10-CM

## 2021-10-19 RX ORDER — AZELASTINE 1 MG/ML
2 SPRAY, METERED NASAL 2 TIMES DAILY
Qty: 120 ML | Refills: 2 | Status: SHIPPED | OUTPATIENT
Start: 2021-10-19 | End: 2022-08-25 | Stop reason: SDUPTHER

## 2021-10-28 ENCOUNTER — APPOINTMENT (OUTPATIENT)
Dept: MEDICAL GROUP | Facility: PHYSICIAN GROUP | Age: 56
End: 2021-10-28
Payer: COMMERCIAL

## 2021-12-01 DIAGNOSIS — E78.1 HYPERTRIGLYCERIDEMIA: ICD-10-CM

## 2021-12-01 RX ORDER — FENOFIBRATE 145 MG/1
145 TABLET, COATED ORAL DAILY
Qty: 90 TABLET | Refills: 2 | Status: SHIPPED | OUTPATIENT
Start: 2021-12-01 | End: 2022-08-17 | Stop reason: SDUPTHER

## 2021-12-17 DIAGNOSIS — B00.2 RECURRENT ORAL HERPES SIMPLEX: ICD-10-CM

## 2021-12-17 NOTE — TELEPHONE ENCOUNTER
Was the patient seen in the last year in this department? 7/28/21    Does patient have an active prescription for medications requested? Yes    Received Request Via: Pharmacy    Hospital Outpatient Visit on 07/15/2021   Component Date Value   • Glycohemoglobin 07/15/2021 6.7*   • Est Avg Glucose 07/15/2021 146    • Cholesterol,Tot 07/15/2021 161    • Triglycerides 07/15/2021 163*   • HDL 07/15/2021 26*   • LDL 07/15/2021 102*   • Sodium 07/15/2021 143    • Potassium 07/15/2021 4.0    • Chloride 07/15/2021 106    • Co2 07/15/2021 25    • Anion Gap 07/15/2021 12.0    • Glucose 07/15/2021 141*   • Bun 07/15/2021 16    • Creatinine 07/15/2021 1.22    • Calcium 07/15/2021 9.7    • AST(SGOT) 07/15/2021 28    • ALT(SGPT) 07/15/2021 34    • Alkaline Phosphatase 07/15/2021 78    • Total Bilirubin 07/15/2021 0.3    • Albumin 07/15/2021 4.3    • Total Protein 07/15/2021 7.1    • Globulin 07/15/2021 2.8    • A-G Ratio 07/15/2021 1.5    • WBC 07/15/2021 7.7    • RBC 07/15/2021 5.16    • Hemoglobin 07/15/2021 15.5    • Hematocrit 07/15/2021 47.4    • MCV 07/15/2021 91.9    • MCH 07/15/2021 30.0    • MCHC 07/15/2021 32.7*   • RDW 07/15/2021 44.3    • Platelet Count 07/15/2021 262    • MPV 07/15/2021 10.4    • Neutrophils-Polys 07/15/2021 59.50    • Lymphocytes 07/15/2021 24.40    • Monocytes 07/15/2021 11.00    • Eosinophils 07/15/2021 3.80    • Basophils 07/15/2021 0.80    • Immature Granulocytes 07/15/2021 0.50    • Nucleated RBC 07/15/2021 0.00    • Neutrophils (Absolute) 07/15/2021 4.59    • Lymphs (Absolute) 07/15/2021 1.88    • Monos (Absolute) 07/15/2021 0.85    • Eos (Absolute) 07/15/2021 0.29    • Baso (Absolute) 07/15/2021 0.06    • Immature Granulocytes (a* 07/15/2021 0.04    • NRBC (Absolute) 07/15/2021 0.00    • Fasting Status 07/15/2021 Fasting    • GFR If  07/15/2021 >60    • GFR If Non  Ameri* 07/15/2021 >60

## 2021-12-19 RX ORDER — VALACYCLOVIR HYDROCHLORIDE 1 G/1
1000 TABLET, FILM COATED ORAL DAILY
Qty: 90 TABLET | Refills: 2 | Status: SHIPPED | OUTPATIENT
Start: 2021-12-19 | End: 2024-01-10 | Stop reason: SDUPTHER

## 2022-08-17 ENCOUNTER — OFFICE VISIT (OUTPATIENT)
Dept: MEDICAL GROUP | Facility: PHYSICIAN GROUP | Age: 57
End: 2022-08-17
Payer: COMMERCIAL

## 2022-08-17 VITALS
SYSTOLIC BLOOD PRESSURE: 124 MMHG | WEIGHT: 283 LBS | OXYGEN SATURATION: 97 % | HEART RATE: 87 BPM | BODY MASS INDEX: 36.32 KG/M2 | HEIGHT: 74 IN | TEMPERATURE: 98.3 F | DIASTOLIC BLOOD PRESSURE: 72 MMHG

## 2022-08-17 DIAGNOSIS — R60.0 LEG EDEMA, RIGHT: ICD-10-CM

## 2022-08-17 DIAGNOSIS — E11.65 TYPE 2 DIABETES MELLITUS WITH HYPERGLYCEMIA, WITHOUT LONG-TERM CURRENT USE OF INSULIN (HCC): ICD-10-CM

## 2022-08-17 DIAGNOSIS — E78.1 HYPERTRIGLYCERIDEMIA: ICD-10-CM

## 2022-08-17 DIAGNOSIS — E66.9 OBESITY (BMI 30-39.9): ICD-10-CM

## 2022-08-17 DIAGNOSIS — Z12.12 SCREENING FOR COLORECTAL CANCER: ICD-10-CM

## 2022-08-17 DIAGNOSIS — Z87.09 HISTORY OF DEVIATED NASAL SEPTUM: ICD-10-CM

## 2022-08-17 DIAGNOSIS — R01.1 SYSTOLIC MURMUR: ICD-10-CM

## 2022-08-17 DIAGNOSIS — K21.9 GASTROESOPHAGEAL REFLUX DISEASE, UNSPECIFIED WHETHER ESOPHAGITIS PRESENT: ICD-10-CM

## 2022-08-17 DIAGNOSIS — J30.2 SEASONAL ALLERGIC RHINITIS, UNSPECIFIED TRIGGER: ICD-10-CM

## 2022-08-17 DIAGNOSIS — Z12.11 SCREENING FOR COLORECTAL CANCER: ICD-10-CM

## 2022-08-17 DIAGNOSIS — Z12.5 SCREENING FOR PROSTATE CANCER: ICD-10-CM

## 2022-08-17 PROCEDURE — 99214 OFFICE O/P EST MOD 30 MIN: CPT | Performed by: NURSE PRACTITIONER

## 2022-08-17 RX ORDER — OMEPRAZOLE 20 MG/1
20 CAPSULE, DELAYED RELEASE ORAL DAILY
Qty: 90 CAPSULE | Refills: 3 | Status: SHIPPED | OUTPATIENT
Start: 2022-08-17 | End: 2023-07-10 | Stop reason: SDUPTHER

## 2022-08-17 RX ORDER — FENOFIBRATE 145 MG/1
145 TABLET, COATED ORAL DAILY
Qty: 90 TABLET | Refills: 3 | Status: SHIPPED | OUTPATIENT
Start: 2022-08-17 | End: 2023-07-10 | Stop reason: SDUPTHER

## 2022-08-17 RX ORDER — TRIAMTERENE AND HYDROCHLOROTHIAZIDE 37.5; 25 MG/1; MG/1
1 TABLET ORAL DAILY
Qty: 90 TABLET | Refills: 3 | Status: SHIPPED | OUTPATIENT
Start: 2022-08-17 | End: 2023-06-08

## 2022-08-17 ASSESSMENT — FIBROSIS 4 INDEX: FIB4 SCORE: 1.06

## 2022-08-17 ASSESSMENT — PATIENT HEALTH QUESTIONNAIRE - PHQ9: CLINICAL INTERPRETATION OF PHQ2 SCORE: 0

## 2022-08-17 NOTE — ASSESSMENT & PLAN NOTE
History of deviated septum with surgery and since surgery his allergies have worsened. He is azelastine twice daily. He would like to see an ENT and is requesting a referral.

## 2022-08-17 NOTE — ASSESSMENT & PLAN NOTE
Blood pressure at goal.  He is taking triamterene-hydrochlorothiazide 37.5-25 mg daily for leg edema not blood pressure. He does need a medication refill today.

## 2022-08-17 NOTE — PROGRESS NOTES
Subjective:     CC: requesting labs    HPI:   Kofi presents today with the following:    Leg edema, right  Blood pressure at goal.  He is taking triamterene-hydrochlorothiazide 37.5-25 mg daily for leg edema not blood pressure. He does need a medication refill today.     GERD (gastroesophageal reflux disease)  He is taking omeprazole 20 mg daily. He is not having any heartburn or indigestion.  When he stops the medication he has return of his heartburn and indigestion.  He would like a medication refill today.    Hypertriglyceridemia  He is taking fenofibrate 145 mg daily. He does need a medication refill. He has recently purchased a total gym and is working on the time of day that works best for him. He has decreased his fast food intake from daily down to 1 day a week.     Allergic rhinitis, seasonal  History of deviated septum with surgery and since surgery his allergies have worsened. He is azelastine twice daily. He would like to see an ENT and is requesting a referral.     Obesity (BMI 30-39.9)  His BMI today is 36.34 kg/m².  He is due for updated labs.    Type 2 diabetes mellitus with hyperglycemia, without long-term current use of insulin ([facility])  He is not taking any medication.  He has made diet changes.  He is due for updated labs.    Past Medical History:   Diagnosis Date    Allergy     GERD (gastroesophageal reflux disease)     Hyperlipidemia        Social History     Tobacco Use    Smoking status: Never    Smokeless tobacco: Never   Vaping Use    Vaping Use: Never used   Substance Use Topics    Alcohol use: Not Currently     Alcohol/week: 1.2 oz     Types: 2 Cans of beer per week     Comment: 1-1.5 beers daily    Drug use: Never       Current Outpatient Medications Ordered in Epic   Medication Sig Dispense Refill    triamterene-hctz (MAXZIDE-25/DYAZIDE) 37.5-25 MG Tab Take 1 Tablet by mouth every day. 90 Tablet 3    omeprazole (PRILOSEC) 20 MG delayed-release capsule Take 1 Capsule by mouth every day.  "90 Capsule 3    fenofibrate (TRICOR) 145 MG Tab Take 1 Tablet by mouth every day. 90 Tablet 3    valacyclovir (VALTREX) 1 GM Tab Take 1 Tablet by mouth every day. 90 Tablet 2    azelastine (ASTELIN) 137 MCG/SPRAY nasal spray Administer 2 Sprays into affected nostril(S) 2 times a day. 120 mL 2     No current Epic-ordered facility-administered medications on file.       Allergies:  Oxycodone    Health Maintenance: Reviewed. Declines immunization. Due for updated labs.     Objective:     Vital signs reviewed  Exam:  /72 (BP Location: Left arm, Patient Position: Sitting, BP Cuff Size: Adult)   Pulse 87   Temp 36.8 °C (98.3 °F) (Temporal)   Ht 1.88 m (6' 2\")   Wt (!) 128 kg (283 lb)   SpO2 97%   BMI 36.34 kg/m²  Body mass index is 36.34 kg/m².    Gen: Alert and oriented, No apparent distress.  Neck: Neck is supple without lymphadenopathy.  Lungs: Normal effort, CTA bilaterally, no wheezes, rhonchi, or rales  CV: Regular rate and rhythm with systolic murmur. No rubs or gallops.  Ext: No clubbing, cyanosis, edema.      Assessment & Plan:     57 y.o. male with the following -     1. Type 2 diabetes mellitus with hyperglycemia, without long-term current use of insulin (HCC)  Chronic stable problem.  Due for updated labs.  He will return in two weeks for follow-up.  We are requesting his retinal screening from his recent eye doctor's appointment.  - Comp Metabolic Panel; Future  - Hemoglobin A1c; Future  - Lipid Profile; Future  - Microalbumin Creat Ratio Urine - Lab Collect; Future  - CBC WITH DIFFERENTIAL; Future    2. Leg edema, right  Chronic stable problem.  Continue with triamterene-hydrochlorothiazide 37.5-25 mg daily.  Due for updated labs.  Medication refilled today.  - triamterene-hctz (MAXZIDE-25/DYAZIDE) 37.5-25 MG Tab; Take 1 Tablet by mouth every day.  Dispense: 90 Tablet; Refill: 3    3. Gastroesophageal reflux disease, unspecified whether esophagitis present  Chronic stable problem.  Continue with " omeprazole 20 mg daily.  Medication refilled today.  - omeprazole (PRILOSEC) 20 MG delayed-release capsule; Take 1 Capsule by mouth every day.  Dispense: 90 Capsule; Refill: 3    4. Hypertriglyceridemia  Chronic stable problem.  Continue fenofibrate 145 mg daily.  Medication refill today.  Due for updated labs.  - fenofibrate (TRICOR) 145 MG Tab; Take 1 Tablet by mouth every day.  Dispense: 90 Tablet; Refill: 3    5. Systolic murmur  Acute uncomplicated problem.  New problem to examiner today.  He is asymptomatic.  Check echocardiogram.  - EC-ECHOCARDIOGRAM COMPLETE W/O CONT; Future    6. Seasonal allergic rhinitis, unspecified trigger  Chronic exacerbated problem.  Asking for referral to ENT today.  Continue with his Astelin.  - Referral to ENT    7. History of deviated nasal septum  See #6 above  - Referral to ENT    8. Obesity (BMI 30-39.9)  Chronic stable problem.  Checking updated labs.  - Patient identified as having weight management issue.  Appropriate orders and counseling given.    9. Screening for prostate cancer  Chronic stable problem.  Due for screening.  - PROSTATE SPECIFIC AG SCREENING; Future    10. Screening for colorectal cancer  Chronic stable problem.  He is requesting a new referral as she did not complete his colonoscopy last year.  - Referral to GI for Colonoscopy        Return in about 2 weeks (around 8/31/2022) for Labs.    Please note that this dictation was created using voice recognition software. I have made every reasonable attempt to correct obvious errors, but I expect that there are errors of grammar and possibly content that I did not discover before finalizing the note.

## 2022-08-17 NOTE — ASSESSMENT & PLAN NOTE
He is taking omeprazole 20 mg daily. He is not having any heartburn or indigestion.  When he stops the medication he has return of his heartburn and indigestion.  He would like a medication refill today.

## 2022-08-17 NOTE — ASSESSMENT & PLAN NOTE
He is taking fenofibrate 145 mg daily. He does need a medication refill. He has recently purchased a total gym and is working on the time of day that works best for him. He has decreased his fast food intake from daily down to 1 day a week.

## 2022-08-17 NOTE — LETTER
[facility]  WILBERT Watt.  [address] [place] [address]  Fax: [phone]   Authorization for Release/Disclosure of   Protected Health Information   Name: TONIA UMANZOR : 1965 SSN: [id number]   Address: [address] [place] [address]  [place] [address] Phone:    [phone] (home) [phone] (work)   I authorize the entity listed below to release/disclose the PHI below to:   [facility]/HANNAH Watt and HANNAH Watt   Provider or Entity Name:  [facility]    Address   City, State, Zip  [address] [place] [address] Phone:      Fax:     Reason for request: continuity of care   Information to be released:    [  ] LAST COLONOSCOPY,  including any PATH REPORT and follow-up  [  ] LAST FIT/COLOGUARD RESULT [  ] LAST DEXA  [  ] LAST MAMMOGRAM  [  ] LAST PAP  [  ] LAST LABS [x] RETINA EXAM REPORT  [  ] IMMUNIZATION RECORDS  [  ] Release all info      [  ] Check here and initial the line next to each item to release ALL health information INCLUDING  _____ Care and treatment for drug and / or alcohol abuse  _____ HIV testing, infection status, or AIDS  _____ Genetic Testing    DATES OF SERVICE OR TIME PERIOD TO BE DISCLOSED: _____________  I understand and acknowledge that:  * This Authorization may be revoked at any time by you in writing, except if your health information has already been used or disclosed.  * Your health information that will be used or disclosed as a result of you signing this authorization could be re-disclosed by the recipient. If this occurs, your re-disclosed health information may no longer be protected by State or Federal laws.  * You may refuse to sign this Authorization. Your refusal will not affect your ability to obtain treatment.  * This Authorization becomes effective upon signing and will  on (date) __________.      If no date is indicated, this Authorization will  one (1) year from the  signature date.    Name: Kofi Kimbledeja Wall    Signature:   Date:     8/17/2022       PLEASE FAX REQUESTED RECORDS BACK TO: [phone]

## 2022-08-25 DIAGNOSIS — J30.2 SEASONAL ALLERGIC RHINITIS, UNSPECIFIED TRIGGER: ICD-10-CM

## 2022-08-25 RX ORDER — AZELASTINE 1 MG/ML
2 SPRAY, METERED NASAL 2 TIMES DAILY
Qty: 120 ML | Refills: 3 | Status: SHIPPED | OUTPATIENT
Start: 2022-08-25 | End: 2023-08-14 | Stop reason: SDUPTHER

## 2022-08-25 NOTE — TELEPHONE ENCOUNTER
Requested Prescriptions     Signed Prescriptions Disp Refills    azelastine (ASTELIN) 137 MCG/SPRAY nasal spray 120 mL 3     Sig: Administer 2 Sprays into affected nostril(S) 2 times a day.     Authorizing Provider: TAYO WERNER A.P.R.N.

## 2022-08-26 ENCOUNTER — HOSPITAL ENCOUNTER (OUTPATIENT)
Dept: LAB | Facility: MEDICAL CENTER | Age: 57
End: 2022-08-26
Attending: NURSE PRACTITIONER
Payer: COMMERCIAL

## 2022-08-26 DIAGNOSIS — Z12.5 SCREENING FOR PROSTATE CANCER: ICD-10-CM

## 2022-08-26 DIAGNOSIS — E11.65 TYPE 2 DIABETES MELLITUS WITH HYPERGLYCEMIA, WITHOUT LONG-TERM CURRENT USE OF INSULIN (HCC): ICD-10-CM

## 2022-08-26 LAB
ALBUMIN SERPL BCP-MCNC: 4.9 G/DL (ref 3.2–4.9)
ALBUMIN/GLOB SERPL: 1.9 G/DL
ALP SERPL-CCNC: 81 U/L (ref 30–99)
ALT SERPL-CCNC: 39 U/L (ref 2–50)
ANION GAP SERPL CALC-SCNC: 14 MMOL/L (ref 7–16)
AST SERPL-CCNC: 28 U/L (ref 12–45)
BASOPHILS # BLD AUTO: 0.6 % (ref 0–1.8)
BASOPHILS # BLD: 0.04 K/UL (ref 0–0.12)
BILIRUB SERPL-MCNC: 0.3 MG/DL (ref 0.1–1.5)
BUN SERPL-MCNC: 19 MG/DL (ref 8–22)
CALCIUM SERPL-MCNC: 10.2 MG/DL (ref 8.5–10.5)
CHLORIDE SERPL-SCNC: 103 MMOL/L (ref 96–112)
CHOLEST SERPL-MCNC: 180 MG/DL (ref 100–199)
CO2 SERPL-SCNC: 24 MMOL/L (ref 20–33)
CREAT SERPL-MCNC: 1.41 MG/DL (ref 0.5–1.4)
CREAT UR-MCNC: 117.35 MG/DL
EOSINOPHIL # BLD AUTO: 0.23 K/UL (ref 0–0.51)
EOSINOPHIL NFR BLD: 3.3 % (ref 0–6.9)
ERYTHROCYTE [DISTWIDTH] IN BLOOD BY AUTOMATED COUNT: 44.1 FL (ref 35.9–50)
EST. AVERAGE GLUCOSE BLD GHB EST-MCNC: 128 MG/DL
FASTING STATUS PATIENT QL REPORTED: NORMAL
GFR SERPLBLD CREATININE-BSD FMLA CKD-EPI: 58 ML/MIN/1.73 M 2
GLOBULIN SER CALC-MCNC: 2.6 G/DL (ref 1.9–3.5)
GLUCOSE SERPL-MCNC: 136 MG/DL (ref 65–99)
HBA1C MFR BLD: 6.1 % (ref 4–5.6)
HCT VFR BLD AUTO: 49.9 % (ref 42–52)
HDLC SERPL-MCNC: 26 MG/DL
HGB BLD-MCNC: 16.9 G/DL (ref 14–18)
IMM GRANULOCYTES # BLD AUTO: 0.02 K/UL (ref 0–0.11)
IMM GRANULOCYTES NFR BLD AUTO: 0.3 % (ref 0–0.9)
LDLC SERPL CALC-MCNC: 123 MG/DL
LYMPHOCYTES # BLD AUTO: 1.81 K/UL (ref 1–4.8)
LYMPHOCYTES NFR BLD: 25.7 % (ref 22–41)
MCH RBC QN AUTO: 30.9 PG (ref 27–33)
MCHC RBC AUTO-ENTMCNC: 33.9 G/DL (ref 33.7–35.3)
MCV RBC AUTO: 91.2 FL (ref 81.4–97.8)
MICROALBUMIN UR-MCNC: <1.2 MG/DL
MICROALBUMIN/CREAT UR: NORMAL MG/G (ref 0–30)
MONOCYTES # BLD AUTO: 0.79 K/UL (ref 0–0.85)
MONOCYTES NFR BLD AUTO: 11.2 % (ref 0–13.4)
NEUTROPHILS # BLD AUTO: 4.16 K/UL (ref 1.82–7.42)
NEUTROPHILS NFR BLD: 58.9 % (ref 44–72)
NRBC # BLD AUTO: 0 K/UL
NRBC BLD-RTO: 0 /100 WBC
PLATELET # BLD AUTO: 272 K/UL (ref 164–446)
PMV BLD AUTO: 10.3 FL (ref 9–12.9)
POTASSIUM SERPL-SCNC: 4.5 MMOL/L (ref 3.6–5.5)
PROT SERPL-MCNC: 7.5 G/DL (ref 6–8.2)
PSA SERPL-MCNC: 0.52 NG/ML (ref 0–4)
RBC # BLD AUTO: 5.47 M/UL (ref 4.7–6.1)
SODIUM SERPL-SCNC: 141 MMOL/L (ref 135–145)
TRIGL SERPL-MCNC: 157 MG/DL (ref 0–149)
WBC # BLD AUTO: 7.1 K/UL (ref 4.8–10.8)

## 2022-08-26 PROCEDURE — 82043 UR ALBUMIN QUANTITATIVE: CPT

## 2022-08-26 PROCEDURE — 80061 LIPID PANEL: CPT

## 2022-08-26 PROCEDURE — 83036 HEMOGLOBIN GLYCOSYLATED A1C: CPT

## 2022-08-26 PROCEDURE — 36415 COLL VENOUS BLD VENIPUNCTURE: CPT

## 2022-08-26 PROCEDURE — 82570 ASSAY OF URINE CREATININE: CPT

## 2022-08-26 PROCEDURE — 80053 COMPREHEN METABOLIC PANEL: CPT

## 2022-08-26 PROCEDURE — 84153 ASSAY OF PSA TOTAL: CPT

## 2022-08-26 PROCEDURE — 85025 COMPLETE CBC W/AUTO DIFF WBC: CPT

## 2022-08-31 ENCOUNTER — OFFICE VISIT (OUTPATIENT)
Dept: MEDICAL GROUP | Facility: PHYSICIAN GROUP | Age: 57
End: 2022-08-31
Payer: COMMERCIAL

## 2022-08-31 VITALS
HEIGHT: 74 IN | SYSTOLIC BLOOD PRESSURE: 132 MMHG | BODY MASS INDEX: 36.19 KG/M2 | OXYGEN SATURATION: 98 % | WEIGHT: 282 LBS | HEART RATE: 98 BPM | DIASTOLIC BLOOD PRESSURE: 68 MMHG | TEMPERATURE: 98.4 F

## 2022-08-31 DIAGNOSIS — E11.65 TYPE 2 DIABETES MELLITUS WITH HYPERGLYCEMIA, WITHOUT LONG-TERM CURRENT USE OF INSULIN (HCC): ICD-10-CM

## 2022-08-31 DIAGNOSIS — E11.22 TYPE 2 DIABETES MELLITUS WITH STAGE 3A CHRONIC KIDNEY DISEASE, WITHOUT LONG-TERM CURRENT USE OF INSULIN (HCC): ICD-10-CM

## 2022-08-31 DIAGNOSIS — E78.1 HYPERTRIGLYCERIDEMIA: ICD-10-CM

## 2022-08-31 DIAGNOSIS — N18.31 TYPE 2 DIABETES MELLITUS WITH STAGE 3A CHRONIC KIDNEY DISEASE, WITHOUT LONG-TERM CURRENT USE OF INSULIN (HCC): ICD-10-CM

## 2022-08-31 DIAGNOSIS — R60.0 LEG EDEMA, RIGHT: ICD-10-CM

## 2022-08-31 PROCEDURE — 99214 OFFICE O/P EST MOD 30 MIN: CPT | Performed by: NURSE PRACTITIONER

## 2022-08-31 ASSESSMENT — FIBROSIS 4 INDEX: FIB4 SCORE: 0.96

## 2022-08-31 NOTE — ASSESSMENT & PLAN NOTE
Recent labs show triglyceride 157 which is down from previous 163.  LDL has increased from 102-123.  He continues with fenofibrate 145 mg daily.  He states that he has been on atorvastatin in the past as well as niacin.  He did not tolerate niacin.  States that his atorvastatin was stopped after his cholesterol improved. The 10-year ASCVD risk score (Ishmael CAST Jr., et al., 2013) is: 20.9%

## 2022-08-31 NOTE — PROGRESS NOTES
Subjective:     CC: lab results     HPI:   Kofi presents today with the following:      Type 2 diabetes mellitus with stage 3a chronic kidney disease, without long-term current use of insulin ([facility])  Recent A1C A1c is improved from 6.7% to 6.1%.  He continues with diet control and exercise.  He is not on statin.  Urine microalbumin is normal.  Recent GFR is slightly decreased to 58.  Blood pressure is controlled.    Hypertriglyceridemia  Recent labs show triglyceride 157 which is down from previous 163.  LDL has increased from 102-123.  He continues with fenofibrate 145 mg daily.  He states that he has been on atorvastatin in the past as well as niacin.  He did not tolerate niacin.  States that his atorvastatin was stopped after his cholesterol improved. The 10-year ASCVD risk score (Houston DC Jr., et al., 2013) is: 20.9%      Leg edema, right  He continues with triamterene-hydrochlorothiazide 37.5-25 mg daily.  He notices after 3 days the edema is worse.  Given his recent slight decrease in GFR and kidney function he will start taking every other day.              Past Medical History:   Diagnosis Date    Allergy     GERD (gastroesophageal reflux disease)     Hyperlipidemia        Social History     Tobacco Use    Smoking status: Never    Smokeless tobacco: Never   Vaping Use    Vaping Use: Never used   Substance Use Topics    Alcohol use: Not Currently     Alcohol/week: 1.2 oz     Types: 2 Cans of beer per week     Comment: 1-1.5 beers daily    Drug use: Never       Current Outpatient Medications Ordered in Epic   Medication Sig Dispense Refill    Turmeric (QC TUMERIC COMPLEX PO) Take 2 Capsules by mouth every day.      APPLE CIDER VINEGAR PO Take 1 Tablet by mouth every day.      azelastine (ASTELIN) 137 MCG/SPRAY nasal spray Administer 2 Sprays into affected nostril(S) 2 times a day. 120 mL 3    triamterene-hctz (MAXZIDE-25/DYAZIDE) 37.5-25 MG Tab Take 1 Tablet by mouth every day. 90 Tablet 3    omeprazole  "(PRILOSEC) 20 MG delayed-release capsule Take 1 Capsule by mouth every day. 90 Capsule 3    fenofibrate (TRICOR) 145 MG Tab Take 1 Tablet by mouth every day. 90 Tablet 3    valacyclovir (VALTREX) 1 GM Tab Take 1 Tablet by mouth every day. 90 Tablet 2     No current Epic-ordered facility-administered medications on file.       Allergies:  Oxycodone    Health Maintenance: Due for monofilament.  We have requested retinal records.      Objective:     Vital signs reviewed  Exam:  /68 (BP Location: Right arm, Patient Position: Sitting, BP Cuff Size: Adult)   Pulse 98   Temp 36.9 °C (98.4 °F) (Temporal)   Ht 1.88 m (6' 2\")   Wt (!) 128 kg (282 lb)   SpO2 98%   BMI 36.21 kg/m²  Body mass index is 36.21 kg/m².    Gen: Alert and oriented, No apparent distress.  Eyes:   Lids normal. Glasses in place.   Lungs: Normal effort, CTA bilaterally, no wheezes, rhonchi, or rales  CV: Regular rate and rhythm with systolic murmur.  No rubs or gallops.  Ext: No clubbing, cyanosis, edema.    Monofilament testing with a 10 gram force: sensation intact: intact bilaterally  Visual Inspection: Feet without maceration, ulcers, fissures.  Pedal pulses: intact bilaterally      Assessment & Plan:     57 y.o. male with the following -     1. Type 2 diabetes mellitus with hyperglycemia, without long-term current use of insulin (HCC)  Chronic stable problem.  A1c has improved to 6.1%.  Continue with diet and lifestyle modifications.  Plan to check updated labs in 6 months around March 2023.    - Lipid Profile; Future  - HEMOGLOBIN A1C; Future  - Basic Metabolic Panel; Future    2. Type 2 diabetes mellitus with stage 3a chronic kidney disease, without long-term current use of insulin (HCC)  Chronic exacerbated problem.  Recent GFR slightly decreased to 58.  He will decrease his triamterene-hydrochlorothiazide to every other day dosing.  Repeat labs in 6 months.  Monofilament exam completed today.  Urine microalbumin was normal.  A1c is at " goal.  - Diabetic Monofilament LE Exam    3. Hypertriglyceridemia  Chronic stable problem.  Discussed switching over to statin medication which he declines at this time.  We did discuss that with his diabetes and his ASCVD risk score I do recommend he be on a statin medication.  He would like to work on diet and lifestyle modifications first.  He will continue with his fenofibrate 145 mg daily.    4. Leg edema, right  Chronic stable problem.  Discussed reviewed recent lab results.  He will try taking his triamterene-hydrochlorothiazide every other day.  Repeat labs in 6 months.      Return in about 6 months (around 2/28/2023) for Labs, Diabetes.    Please note that this dictation was created using voice recognition software. I have made every reasonable attempt to correct obvious errors, but I expect that there are errors of grammar and possibly content that I did not discover before finalizing the note.

## 2022-08-31 NOTE — ASSESSMENT & PLAN NOTE
Recent A1C A1c is improved from 6.7% to 6.1%.  He continues with diet control and exercise.  He is not on statin.  Urine microalbumin is normal.  Recent GFR is slightly decreased to 58.  Blood pressure is controlled.

## 2022-09-01 DIAGNOSIS — J30.2 SEASONAL ALLERGIC RHINITIS, UNSPECIFIED TRIGGER: ICD-10-CM

## 2022-09-01 DIAGNOSIS — Z87.09 HISTORY OF DEVIATED NASAL SEPTUM: ICD-10-CM

## 2022-09-02 NOTE — PROGRESS NOTES
Patient requesting ENT referral to Dr. Yael Figueroa per referral department and needing new referral.

## 2022-12-23 ENCOUNTER — HOSPITAL ENCOUNTER (OUTPATIENT)
Facility: MEDICAL CENTER | Age: 57
End: 2022-12-23
Attending: OTOLARYNGOLOGY
Payer: COMMERCIAL

## 2022-12-23 PROCEDURE — 87205 SMEAR GRAM STAIN: CPT

## 2022-12-23 PROCEDURE — 87077 CULTURE AEROBIC IDENTIFY: CPT

## 2022-12-23 PROCEDURE — 87070 CULTURE OTHR SPECIMN AEROBIC: CPT

## 2022-12-23 PROCEDURE — 87186 SC STD MICRODIL/AGAR DIL: CPT

## 2022-12-23 PROCEDURE — 87075 CULTR BACTERIA EXCEPT BLOOD: CPT

## 2022-12-24 LAB
GRAM STN SPEC: NORMAL
SIGNIFICANT IND 70042: NORMAL
SITE SITE: NORMAL
SOURCE SOURCE: NORMAL

## 2022-12-25 LAB
BACTERIA WND AEROBE CULT: ABNORMAL
BACTERIA WND AEROBE CULT: ABNORMAL
GRAM STN SPEC: ABNORMAL
SIGNIFICANT IND 70042: ABNORMAL
SITE SITE: ABNORMAL
SOURCE SOURCE: ABNORMAL

## 2022-12-26 LAB
BACTERIA SPEC ANAEROBE CULT: NORMAL
SIGNIFICANT IND 70042: NORMAL
SITE SITE: NORMAL
SOURCE SOURCE: NORMAL

## 2023-04-18 ENCOUNTER — TELEPHONE (OUTPATIENT)
Dept: HEALTH INFORMATION MANAGEMENT | Facility: OTHER | Age: 58
End: 2023-04-18

## 2023-06-06 ENCOUNTER — HOSPITAL ENCOUNTER (OUTPATIENT)
Dept: LAB | Facility: MEDICAL CENTER | Age: 58
End: 2023-06-06
Attending: NURSE PRACTITIONER
Payer: COMMERCIAL

## 2023-06-06 DIAGNOSIS — E11.65 TYPE 2 DIABETES MELLITUS WITH HYPERGLYCEMIA, WITHOUT LONG-TERM CURRENT USE OF INSULIN (HCC): ICD-10-CM

## 2023-06-06 LAB
ANION GAP SERPL CALC-SCNC: 13 MMOL/L (ref 7–16)
BUN SERPL-MCNC: 18 MG/DL (ref 8–22)
CALCIUM SERPL-MCNC: 10.1 MG/DL (ref 8.5–10.5)
CHLORIDE SERPL-SCNC: 104 MMOL/L (ref 96–112)
CHOLEST SERPL-MCNC: 176 MG/DL (ref 100–199)
CO2 SERPL-SCNC: 25 MMOL/L (ref 20–33)
CREAT SERPL-MCNC: 1.17 MG/DL (ref 0.5–1.4)
EST. AVERAGE GLUCOSE BLD GHB EST-MCNC: 137 MG/DL
FASTING STATUS PATIENT QL REPORTED: NORMAL
GFR SERPLBLD CREATININE-BSD FMLA CKD-EPI: 72 ML/MIN/1.73 M 2
GLUCOSE SERPL-MCNC: 135 MG/DL (ref 65–99)
HBA1C MFR BLD: 6.4 % (ref 4–5.6)
HDLC SERPL-MCNC: 29 MG/DL
LDLC SERPL CALC-MCNC: 115 MG/DL
POTASSIUM SERPL-SCNC: 4.2 MMOL/L (ref 3.6–5.5)
SODIUM SERPL-SCNC: 142 MMOL/L (ref 135–145)
TRIGL SERPL-MCNC: 158 MG/DL (ref 0–149)

## 2023-06-06 PROCEDURE — 80061 LIPID PANEL: CPT

## 2023-06-06 PROCEDURE — 80048 BASIC METABOLIC PNL TOTAL CA: CPT

## 2023-06-06 PROCEDURE — 83036 HEMOGLOBIN GLYCOSYLATED A1C: CPT

## 2023-06-06 PROCEDURE — 36415 COLL VENOUS BLD VENIPUNCTURE: CPT

## 2023-06-08 ENCOUNTER — OFFICE VISIT (OUTPATIENT)
Dept: MEDICAL GROUP | Facility: PHYSICIAN GROUP | Age: 58
End: 2023-06-08
Payer: COMMERCIAL

## 2023-06-08 VITALS
SYSTOLIC BLOOD PRESSURE: 124 MMHG | DIASTOLIC BLOOD PRESSURE: 72 MMHG | WEIGHT: 284 LBS | BODY MASS INDEX: 36.45 KG/M2 | TEMPERATURE: 98.8 F | OXYGEN SATURATION: 92 % | HEART RATE: 80 BPM | HEIGHT: 74 IN

## 2023-06-08 DIAGNOSIS — E78.1 HYPERTRIGLYCERIDEMIA: ICD-10-CM

## 2023-06-08 DIAGNOSIS — E11.22 TYPE 2 DIABETES MELLITUS WITH STAGE 3A CHRONIC KIDNEY DISEASE, WITHOUT LONG-TERM CURRENT USE OF INSULIN (HCC): ICD-10-CM

## 2023-06-08 DIAGNOSIS — R60.0 LEG EDEMA, RIGHT: ICD-10-CM

## 2023-06-08 DIAGNOSIS — N18.31 TYPE 2 DIABETES MELLITUS WITH STAGE 3A CHRONIC KIDNEY DISEASE, WITHOUT LONG-TERM CURRENT USE OF INSULIN (HCC): ICD-10-CM

## 2023-06-08 DIAGNOSIS — E66.9 OBESITY (BMI 30-39.9): ICD-10-CM

## 2023-06-08 PROBLEM — J32.4 CHRONIC PANSINUSITIS: Status: ACTIVE | Noted: 2023-06-08

## 2023-06-08 PROBLEM — J34.3 HYPERTROPHY OF NASAL TURBINATES: Status: ACTIVE | Noted: 2023-06-08

## 2023-06-08 PROCEDURE — 3078F DIAST BP <80 MM HG: CPT | Performed by: NURSE PRACTITIONER

## 2023-06-08 PROCEDURE — 99214 OFFICE O/P EST MOD 30 MIN: CPT | Performed by: NURSE PRACTITIONER

## 2023-06-08 PROCEDURE — 3074F SYST BP LT 130 MM HG: CPT | Performed by: NURSE PRACTITIONER

## 2023-06-08 RX ORDER — TRIAMTERENE AND HYDROCHLOROTHIAZIDE 37.5; 25 MG/1; MG/1
1 TABLET ORAL
Qty: 45 TABLET | Refills: 3 | Status: SHIPPED | OUTPATIENT
Start: 2023-06-08

## 2023-06-08 ASSESSMENT — FIBROSIS 4 INDEX: FIB4 SCORE: 0.97

## 2023-06-08 ASSESSMENT — PATIENT HEALTH QUESTIONNAIRE - PHQ9: CLINICAL INTERPRETATION OF PHQ2 SCORE: 0

## 2023-06-08 NOTE — ASSESSMENT & PLAN NOTE
Previous A1c 6.1%.  Recent A1c 6.4%.  Continues with diet control. His mother-in-law passed away and has not been able to exercise as he was caring for her.  He plans to resume his exercising and watching his diet.

## 2023-06-08 NOTE — ASSESSMENT & PLAN NOTE
Recent labs show triglycerides at 158 and LDL of 115.  Currently on fenofibrate 145 mg daily. He was previously on lipitor for years. He tried niacin and did not tolerate.  He is not interested in restarting Lipitor.  He did have pain when he touched his muscles when taking Lipitor.The 10-year ASCVD risk score (Wicho MONTAGUE, et al., 2019) is: 20.3%

## 2023-06-08 NOTE — ASSESSMENT & PLAN NOTE
He continues to try Menoring-hydrochlorothiazide 37.5-25 mg every other day.  At his last appointment his dose was switched to every other day dosing due to decreased GFR and elevated creatinine.  Recent labs show kidney function within normal limits.  He states that his edema is good some days are worse.

## 2023-06-08 NOTE — LETTER
[facility]  HANNAH Watt  [address]  [place] [address]  Fax: [phone]   Authorization for Release/Disclosure of   Protected Health Information   Name: TONIA UMANZOR : 1965 SSN: [id number]   Address: [address] [place], [place] [address] [place] [address] Phone:    [phone] (home) [phone] (work)   I authorize the entity listed below to release/disclose the PHI below to:   [facility]/HANNAH Watt and HANNAH Watt   Provider or Entity Name:  [facility]   Address   City, State, Zip   Phone:      Fax:     Reason for request: continuity of care   Information to be released:    [  ] LAST COLONOSCOPY,  including any PATH REPORT and follow-up  [  ] LAST FIT/COLOGUARD RESULT [  ] LAST DEXA  [  ] LAST MAMMOGRAM  [  ] LAST PAP  [  ] LAST LABS [XX] RETINA EXAM REPORT  [  ] IMMUNIZATION RECORDS  [  ] Release all info      [  ] Check here and initial the line next to each item to release ALL health information INCLUDING  _____ Care and treatment for drug and / or alcohol abuse  _____ HIV testing, infection status, or AIDS  _____ Genetic Testing    DATES OF SERVICE OR TIME PERIOD TO BE DISCLOSED: _____________  I understand and acknowledge that:  * This Authorization may be revoked at any time by you in writing, except if your health information has already been used or disclosed.  * Your health information that will be used or disclosed as a result of you signing this authorization could be re-disclosed by the recipient. If this occurs, your re-disclosed health information may no longer be protected by State or Federal laws.  * You may refuse to sign this Authorization. Your refusal will not affect your ability to obtain treatment.  * This Authorization becomes effective upon signing and will  on (date) __________.      If no date is indicated, this Authorization will  one (1) year from the signature date.    Name: Tonia Kaur  Mae  Signature: Date:   6/8/2023     PLEASE FAX REQUESTED RECORDS BACK TO: [phone]

## 2023-06-08 NOTE — PROGRESS NOTES
Subjective:     CC: lab results     HPI:   Kofi presents today with the following:    Hypertriglyceridemia  Recent labs show triglycerides at 158 and LDL of 115.  Currently on fenofibrate 145 mg daily. He was previously on lipitor for years. He tried niacin and did not tolerate.  He is not interested in restarting Lipitor.  He did have pain when he touched his muscles when taking Lipitor.The 10-year ASCVD risk score (Wicho MONTAGUE, et al., 2019) is: 20.3%      Type 2 diabetes mellitus with stage 3a chronic kidney disease, without long-term current use of insulin (HCC)  Previous A1c 6.1%.  Recent A1c 6.4%.  Continues with diet control. His mother-in-law passed away and has not been able to exercise as he was caring for her.  He plans to resume his exercising and watching his diet.    Leg edema, right  He continues to try Menoring-hydrochlorothiazide 37.5-25 mg every other day.  At his last appointment his dose was switched to every other day dosing due to decreased GFR and elevated creatinine.  Recent labs show kidney function within normal limits.  He states that his edema is good some days are worse.    Past Medical History:   Diagnosis Date    Allergy     GERD (gastroesophageal reflux disease)     Hyperlipidemia        Social History     Tobacco Use    Smoking status: Never    Smokeless tobacco: Never   Vaping Use    Vaping Use: Never used   Substance Use Topics    Alcohol use: Not Currently     Alcohol/week: 1.2 oz     Types: 2 Cans of beer per week     Comment: 1-1.5 beers daily    Drug use: Never       Current Outpatient Medications Ordered in Epic   Medication Sig Dispense Refill    triamterene-hctz (MAXZIDE-25/DYAZIDE) 37.5-25 MG Tab Take 1 Tablet by mouth every 48 hours. 45 Tablet 3    Turmeric (QC TUMERIC COMPLEX PO) Take 2 Capsules by mouth every day.      APPLE CIDER VINEGAR PO Take 1 Tablet by mouth every day.      azelastine (ASTELIN) 137 MCG/SPRAY nasal spray Administer 2 Sprays into affected nostril(S)  "2 times a day. 120 mL 3    omeprazole (PRILOSEC) 20 MG delayed-release capsule Take 1 Capsule by mouth every day. 90 Capsule 3    fenofibrate (TRICOR) 145 MG Tab Take 1 Tablet by mouth every day. 90 Tablet 3    valacyclovir (VALTREX) 1 GM Tab Take 1 Tablet by mouth every day. 90 Tablet 2     No current Epic-ordered facility-administered medications on file.       Allergies:  Oxycodone    Health Maintenance: He is scheduled for his retinal screening in a few days with his eye doctor [facility].  We will request records.      Objective:     Vital signs reviewed  Exam:  /72 (BP Location: Left arm, Patient Position: Sitting, BP Cuff Size: Adult)   Pulse 80   Temp 37.1 °C (98.8 °F) (Temporal)   Ht 1.88 m (6' 2\")   Wt (!) 129 kg (284 lb)   SpO2 92%   BMI 36.46 kg/m²  Body mass index is 36.46 kg/m².    Gen: Alert and oriented, No apparent distress.  Eyes:   Lids normal. Glasses in place.   Neck: Neck is supple without lymphadenopathy.  Lungs: Normal effort, CTA bilaterally, no wheezes, rhonchi, or rales  CV: Regular rate and rhythm with systolic murmur. No rubs or gallops.  Ext: No clubbing, cyanosis. Trace BLE edema.      Assessment & Plan:     58 y.o. male with the following -     Discussed and reviewed lab results from 6/6/2023    1. Hypertriglyceridemia  Chronic stable problem.  Discussed and reviewed his ASCVD risk score.  He is not interested in statin today.  He is interested in referral to bariatric surgery, referral placed.  He will continue with his fenofibrate 145 mg daily.  Continue to work on exercise and diet.  Plan to recheck lipid panel around December 2023.  - Patient identified as having weight management issue.  Appropriate orders and counseling given.  - Referral to Bariatric Surgery  - Lipid Profile; Future    2. Type 2 diabetes mellitus with stage 3a chronic kidney disease, without long-term current use of insulin (HCC)  Chronic stable problem.  A1c remains at goal.  " Continue with diet control.  Continue with exercise.  Plan for updated labs around December 2023.    - Patient identified as having weight management issue.  Appropriate orders and counseling given.  - Referral to Bariatric Surgery  - CBC WITH DIFFERENTIAL; Future  - Comp Metabolic Panel; Future  - HEMOGLOBIN A1C; Future  - MICROALBUMIN CREAT RATIO URINE; Future    3. Leg edema, right  Chronic stable problem.  Kidney function within normal limits.  Recommend he continue with triamterene-hydrochlorothiazide 37.5-25 mg every other day.  - triamterene-hctz (MAXZIDE-25/DYAZIDE) 37.5-25 MG Tab; Take 1 Tablet by mouth every 48 hours.  Dispense: 45 Tablet; Refill: 3    4. Obesity (BMI 30-39.9)  Chronic stable problem.  He is interested in referral to bariatric surgery.  Referral placed.  - Patient identified as having weight management issue.  Appropriate orders and counseling given.  - Referral to Bariatric Surgery        Return in about 6 months (around 12/8/2023) for annual, Labs.    Please note that this dictation was created using voice recognition software. I have made every reasonable attempt to correct obvious errors, but I expect that there are errors of grammar and possibly content that I did not discover before finalizing the note.

## 2023-07-10 DIAGNOSIS — E78.1 HYPERTRIGLYCERIDEMIA: ICD-10-CM

## 2023-07-10 DIAGNOSIS — K21.9 GASTROESOPHAGEAL REFLUX DISEASE, UNSPECIFIED WHETHER ESOPHAGITIS PRESENT: ICD-10-CM

## 2023-07-10 RX ORDER — OMEPRAZOLE 20 MG/1
20 CAPSULE, DELAYED RELEASE ORAL DAILY
Qty: 90 CAPSULE | Refills: 0 | Status: SHIPPED | OUTPATIENT
Start: 2023-07-10 | End: 2024-01-10 | Stop reason: SDUPTHER

## 2023-07-10 RX ORDER — FENOFIBRATE 145 MG/1
145 TABLET, COATED ORAL DAILY
Qty: 90 TABLET | Refills: 0 | Status: SHIPPED | OUTPATIENT
Start: 2023-07-10 | End: 2024-01-02 | Stop reason: SDUPTHER

## 2023-07-10 NOTE — TELEPHONE ENCOUNTER
Requested Prescriptions     Pending Prescriptions Disp Refills   • fenofibrate (TRICOR) 145 MG Tab 90 Tablet 0     Sig: Take 1 Tablet by mouth every day.   • omeprazole (PRILOSEC) 20 MG delayed-release capsule 90 Capsule 0     Sig: Take 1 Capsule by mouth every day.       WILBERT Faulkner.

## 2023-08-14 ENCOUNTER — TELEPHONE (OUTPATIENT)
Dept: MEDICAL GROUP | Facility: PHYSICIAN GROUP | Age: 58
End: 2023-08-14
Payer: COMMERCIAL

## 2023-08-14 DIAGNOSIS — J30.2 SEASONAL ALLERGIC RHINITIS, UNSPECIFIED TRIGGER: ICD-10-CM

## 2023-08-14 RX ORDER — AZELASTINE 1 MG/ML
2 SPRAY, METERED NASAL 2 TIMES DAILY
Qty: 120 ML | Refills: 0 | Status: SHIPPED | OUTPATIENT
Start: 2023-08-14 | End: 2024-01-10 | Stop reason: SDUPTHER

## 2023-08-14 NOTE — TELEPHONE ENCOUNTER
Requested Prescriptions     Signed Prescriptions Disp Refills    azelastine (ASTELIN) 137 MCG/SPRAY nasal spray 120 mL 0     Sig: Administer 2 Sprays into affected nostril(S) 2 times a day.     Authorizing Provider: TAYO WERNER A.P.R.N.

## 2023-12-30 ENCOUNTER — PATIENT MESSAGE (OUTPATIENT)
Dept: MEDICAL GROUP | Facility: PHYSICIAN GROUP | Age: 58
End: 2023-12-30
Payer: COMMERCIAL

## 2023-12-30 DIAGNOSIS — E78.1 HYPERTRIGLYCERIDEMIA: ICD-10-CM

## 2024-01-04 RX ORDER — FENOFIBRATE 145 MG/1
145 TABLET, COATED ORAL DAILY
Qty: 90 TABLET | Refills: 0 | Status: SHIPPED | OUTPATIENT
Start: 2024-01-04

## 2024-01-05 NOTE — PROGRESS NOTES
Requested Prescriptions     Signed Prescriptions Disp Refills    fenofibrate (TRICOR) 145 MG Tab 90 Tablet 0     Sig: Take 1 Tablet by mouth every day.     Authorizing Provider: TAYO WERNER A.P.R.N.

## 2024-01-09 ENCOUNTER — HOSPITAL ENCOUNTER (OUTPATIENT)
Dept: LAB | Facility: MEDICAL CENTER | Age: 59
End: 2024-01-09
Attending: NURSE PRACTITIONER
Payer: COMMERCIAL

## 2024-01-09 DIAGNOSIS — E11.22 TYPE 2 DIABETES MELLITUS WITH STAGE 3A CHRONIC KIDNEY DISEASE, WITHOUT LONG-TERM CURRENT USE OF INSULIN (HCC): ICD-10-CM

## 2024-01-09 DIAGNOSIS — E78.1 HYPERTRIGLYCERIDEMIA: ICD-10-CM

## 2024-01-09 DIAGNOSIS — N18.31 TYPE 2 DIABETES MELLITUS WITH STAGE 3A CHRONIC KIDNEY DISEASE, WITHOUT LONG-TERM CURRENT USE OF INSULIN (HCC): ICD-10-CM

## 2024-01-09 LAB
ALBUMIN SERPL BCP-MCNC: 4.8 G/DL (ref 3.2–4.9)
ALBUMIN/GLOB SERPL: 1.7 G/DL
ALP SERPL-CCNC: 69 U/L (ref 30–99)
ALT SERPL-CCNC: 36 U/L (ref 2–50)
ANION GAP SERPL CALC-SCNC: 13 MMOL/L (ref 7–16)
AST SERPL-CCNC: 30 U/L (ref 12–45)
BASOPHILS # BLD AUTO: 0.8 % (ref 0–1.8)
BASOPHILS # BLD: 0.06 K/UL (ref 0–0.12)
BILIRUB SERPL-MCNC: 0.3 MG/DL (ref 0.1–1.5)
BUN SERPL-MCNC: 21 MG/DL (ref 8–22)
CALCIUM ALBUM COR SERPL-MCNC: 9.4 MG/DL (ref 8.5–10.5)
CALCIUM SERPL-MCNC: 10 MG/DL (ref 8.5–10.5)
CHLORIDE SERPL-SCNC: 103 MMOL/L (ref 96–112)
CHOLEST SERPL-MCNC: 149 MG/DL (ref 100–199)
CO2 SERPL-SCNC: 24 MMOL/L (ref 20–33)
CREAT SERPL-MCNC: 1.15 MG/DL (ref 0.5–1.4)
EOSINOPHIL # BLD AUTO: 0.37 K/UL (ref 0–0.51)
EOSINOPHIL NFR BLD: 4.8 % (ref 0–6.9)
ERYTHROCYTE [DISTWIDTH] IN BLOOD BY AUTOMATED COUNT: 41 FL (ref 35.9–50)
EST. AVERAGE GLUCOSE BLD GHB EST-MCNC: 103 MG/DL
FASTING STATUS PATIENT QL REPORTED: NORMAL
GFR SERPLBLD CREATININE-BSD FMLA CKD-EPI: 73 ML/MIN/1.73 M 2
GLOBULIN SER CALC-MCNC: 2.8 G/DL (ref 1.9–3.5)
GLUCOSE SERPL-MCNC: 103 MG/DL (ref 65–99)
HBA1C MFR BLD: 5.2 % (ref 4–5.6)
HCT VFR BLD AUTO: 49.2 % (ref 42–52)
HDLC SERPL-MCNC: 30 MG/DL
HGB BLD-MCNC: 17 G/DL (ref 14–18)
IMM GRANULOCYTES # BLD AUTO: 0.03 K/UL (ref 0–0.11)
IMM GRANULOCYTES NFR BLD AUTO: 0.4 % (ref 0–0.9)
LDLC SERPL CALC-MCNC: 97 MG/DL
LYMPHOCYTES # BLD AUTO: 1.39 K/UL (ref 1–4.8)
LYMPHOCYTES NFR BLD: 18.1 % (ref 22–41)
MCH RBC QN AUTO: 30.4 PG (ref 27–33)
MCHC RBC AUTO-ENTMCNC: 34.6 G/DL (ref 32.3–36.5)
MCV RBC AUTO: 88 FL (ref 81.4–97.8)
MONOCYTES # BLD AUTO: 1.28 K/UL (ref 0–0.85)
MONOCYTES NFR BLD AUTO: 16.7 % (ref 0–13.4)
NEUTROPHILS # BLD AUTO: 4.55 K/UL (ref 1.82–7.42)
NEUTROPHILS NFR BLD: 59.2 % (ref 44–72)
NRBC # BLD AUTO: 0 K/UL
NRBC BLD-RTO: 0 /100 WBC (ref 0–0.2)
PLATELET # BLD AUTO: 288 K/UL (ref 164–446)
PMV BLD AUTO: 10 FL (ref 9–12.9)
POTASSIUM SERPL-SCNC: 3.9 MMOL/L (ref 3.6–5.5)
PROT SERPL-MCNC: 7.6 G/DL (ref 6–8.2)
RBC # BLD AUTO: 5.59 M/UL (ref 4.7–6.1)
SODIUM SERPL-SCNC: 140 MMOL/L (ref 135–145)
TRIGL SERPL-MCNC: 111 MG/DL (ref 0–149)
WBC # BLD AUTO: 7.7 K/UL (ref 4.8–10.8)

## 2024-01-09 PROCEDURE — 85025 COMPLETE CBC W/AUTO DIFF WBC: CPT

## 2024-01-09 PROCEDURE — 83036 HEMOGLOBIN GLYCOSYLATED A1C: CPT

## 2024-01-09 PROCEDURE — 80061 LIPID PANEL: CPT

## 2024-01-09 PROCEDURE — 80053 COMPREHEN METABOLIC PANEL: CPT

## 2024-01-09 PROCEDURE — 36415 COLL VENOUS BLD VENIPUNCTURE: CPT

## 2024-01-10 ENCOUNTER — OFFICE VISIT (OUTPATIENT)
Dept: MEDICAL GROUP | Facility: PHYSICIAN GROUP | Age: 59
End: 2024-01-10
Payer: COMMERCIAL

## 2024-01-10 VITALS
SYSTOLIC BLOOD PRESSURE: 114 MMHG | HEIGHT: 74 IN | BODY MASS INDEX: 32.06 KG/M2 | RESPIRATION RATE: 18 BRPM | OXYGEN SATURATION: 97 % | WEIGHT: 249.8 LBS | DIASTOLIC BLOOD PRESSURE: 64 MMHG | HEART RATE: 113 BPM | TEMPERATURE: 97.5 F

## 2024-01-10 DIAGNOSIS — R73.03 PREDIABETES: ICD-10-CM

## 2024-01-10 DIAGNOSIS — R60.0 LEG EDEMA, RIGHT: ICD-10-CM

## 2024-01-10 DIAGNOSIS — E66.9 OBESITY (BMI 30-39.9): ICD-10-CM

## 2024-01-10 DIAGNOSIS — B00.2 RECURRENT ORAL HERPES SIMPLEX: ICD-10-CM

## 2024-01-10 DIAGNOSIS — J30.2 SEASONAL ALLERGIC RHINITIS, UNSPECIFIED TRIGGER: ICD-10-CM

## 2024-01-10 DIAGNOSIS — K21.9 GASTROESOPHAGEAL REFLUX DISEASE, UNSPECIFIED WHETHER ESOPHAGITIS PRESENT: ICD-10-CM

## 2024-01-10 DIAGNOSIS — E78.1 HYPERTRIGLYCERIDEMIA: ICD-10-CM

## 2024-01-10 PROCEDURE — 3074F SYST BP LT 130 MM HG: CPT

## 2024-01-10 PROCEDURE — 99214 OFFICE O/P EST MOD 30 MIN: CPT

## 2024-01-10 PROCEDURE — 3078F DIAST BP <80 MM HG: CPT

## 2024-01-10 RX ORDER — VALACYCLOVIR HYDROCHLORIDE 1 G/1
1000 TABLET, FILM COATED ORAL DAILY
Qty: 90 TABLET | Refills: 2 | Status: SHIPPED | OUTPATIENT
Start: 2024-01-10

## 2024-01-10 RX ORDER — AZELASTINE 1 MG/ML
2 SPRAY, METERED NASAL 2 TIMES DAILY
Qty: 120 ML | Refills: 0 | Status: SHIPPED | OUTPATIENT
Start: 2024-01-10

## 2024-01-10 RX ORDER — OMEPRAZOLE 20 MG/1
20 CAPSULE, DELAYED RELEASE ORAL DAILY
Qty: 90 CAPSULE | Refills: 3 | Status: SHIPPED | OUTPATIENT
Start: 2024-01-10

## 2024-01-10 ASSESSMENT — FIBROSIS 4 INDEX: FIB4 SCORE: 1.02

## 2024-01-10 ASSESSMENT — PATIENT HEALTH QUESTIONNAIRE - PHQ9: CLINICAL INTERPRETATION OF PHQ2 SCORE: 0

## 2024-01-10 NOTE — ASSESSMENT & PLAN NOTE
Patient reports that his outbreaks are worse in the summer. He notes that eating really salty foods causes outbreaks. He used to be on daily valtrex daily, but now takes this medication for flare ups. In one year, can have around 10 flare ups.

## 2024-01-10 NOTE — ASSESSMENT & PLAN NOTE
Patient reports that he did have a history of hypertriglyceridemia. Used to go out for lunch every day, with hamburgers, but brings chicken now. Has lost 40 lbs.  Recently had labs done that show:   Lab Results   Component Value Date/Time    CHOLSTRLTOT 149 01/09/2024 06:46 AM    TRIGLYCERIDE 111 01/09/2024 06:46 AM    HDL 30 (A) 01/09/2024 06:46 AM    LDL 97 01/09/2024 06:46 AM   ]  He would like to stop taking the tricor due to the lifestyle changes to see if he is able to maintain these labs.

## 2024-01-10 NOTE — ASSESSMENT & PLAN NOTE
Patient reports that he was evaluated by Dr. Galaviz for bariatric surgery but has lost 40 lbs on his own and is not going to be having bariatric surgery.

## 2024-01-10 NOTE — ASSESSMENT & PLAN NOTE
Patient reports that he went to Dr. Galaviz and was advised he was not diabetic. He had an A1c that was elevated to 6.7% back in 2021 but had significant lifestyle changes and most recent A1c was down to 5.2%.
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
## 2024-03-05 NOTE — LETTER
[facility]  Pelvic Health Physical Therapy Initial Evaluation    Date: 3/5/2024   Name: Rea Canohue  Clinic Number: [id number]  Therapy Diagnosis:   Encounter Diagnosis   Name Primary?    Pelvic floor dysfunction Yes     Physician: Cathleen Davidson MD    Physician Orders: PT Eval and Treat    Medical Diagnosis from Referral: Female pelvic pain [R10.2]   Evaluation Date: 3/5/2024  Authorization Period Expiration: 12/17/2024  Plan of Care Expiration: 6/5/2024  Visit # / Visits authorized: 1/ 1    Time In: 3:00  Time Out: 3:55  Total Appointment Time (timed & untimed codes): 55 minutes    Precautions: universal    Subjective     Date of onset: since becoming sexually active    History of current condition - Rea reports: that she is trying to get pregnant.  Has PCOS; has never had pain free intercourse.  Gyn exams are also painful.  Painful periods.  Tampon use is limited as well.      OB/GYN History: G0; PCOS; irregular periods; reports post coital bleeding- more than a tinge  Sexually active? Yes  Pain with vaginal exams, intercourse or tampon use? Yes    Bladder/Bowel History: trouble initiating urine stream, slow stream, trouble emptying bladder completely, constipation/straining for movement, and straining or pushing to empty bladder  Frequency of urination:   Daytime: every 2 hours or so           Nighttime: 5-6 times per night (wakes to bladder pain)  Difficulty initiating urine stream: Yes  Urine stream: interrupted  Complete emptying: No  Bladder leakage: No    Urinary Urgency: No, Able to delay the urge for at least 30 minute(s). (It just hurts)  Frequency of bowel movements:  skips days if she doesn't take her ADHD meds  Difficulty initiating BM: Yes  Quality/Shape of BM: [place] Stool Chart 2-3  Does Patient Feel Empty after BM? No  Fiber Supplements or Laxative Use? Yes; Dulcolax gummies sporadically  Colon leakage: No    Pain:  Location:  vagina   Current 0/10, worst 10/10,  [facility]"[employer]" [facility]  Miguel Wisdom P.A.-C.  [address]  [place] [address]  Fax: [phone]   Authorization for Release/Disclosure of   Protected Health Information   Name: TONIA UMANZOR : 1965 SSN: [id number]   Address: [address] [place] [id number] Phone:    [phone] (home)    I authorize the entity listed below to release/disclose the PHI below to:   [facility]/Miguel Wisdom P.A.-C. and Miguel Wisdom P.A.-C.   Provider or Entity Name:  [facility]   Address   City, State, Zip   Phone:      Fax:     Reason for request: continuity of care   Information to be released:    [X  ] LAST COLONOSCOPY,  including any PATH REPORT and follow-up  [  ] LAST FIT/COLOGUARD RESULT [  ] LAST DEXA  [  ] LAST MAMMOGRAM  [  ] LAST PAP  [  ] LAST LABS [  ] RETINA EXAM REPORT  [  ] IMMUNIZATION RECORDS  [  ] Release all info      [  ] Check here and initial the line next to each item to release ALL health information INCLUDING  _____ Care and treatment for drug and / or alcohol abuse  _____ HIV testing, infection status, or AIDS  _____ Genetic Testing    DATES OF SERVICE OR TIME PERIOD TO BE DISCLOSED: _____________  I understand and acknowledge that:  * This Authorization may be revoked at any time by you in writing, except if your health information has already been used or disclosed.  * Your health information that will be used or disclosed as a result of you signing this authorization could be re-disclosed by the recipient. If this occurs, your re-disclosed health information may no longer be protected by State or Federal laws.  * You may refuse to sign this Authorization. Your refusal will not affect your ability to obtain treatment.  * This Authorization becomes effective upon signing and will  on (date) __________.      If no date is indicated, this Authorization will  one (1) year from the signature date.    Name: Tonia Umanzor    Signature:   Date:          10/29/2019       PLEASE FAX REQUESTED RECORDS BACK TO: [phone]   best 5/10   Description: Deep and Superficial  Aggravating Factors/Activities that cause symptoms: Vaginal exam/provocation and Inserting tampon    Easing Factors: hot bath     Medical History: Rea  has a past medical history of ADHD (attention deficit hyperactivity disorder), GERD (gastroesophageal reflux disease), and PCOS (polycystic ovarian syndrome).     Surgical History: Rea Persaud  has a past surgical history that includes Tyro tooth extraction; Tonsillectomy (05/09/2018); and Adenoidectomy.    Medications: Rea has a current medication list which includes the following prescription(s): lisdexamfetamine.    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  Prior Level of Function: has never been able to have pain free intercourse  Current Level of Function: pain with intercourse    Types of fluid intake: water; occ espresso; Sprite  Diet: TAD   Habitus: well developed, well nourished  Abuse/Neglect: No     Pts goals: to be able to have pain free intercourse and to become pregnant    OBJECTIVE     See EMR under MEDIA for written consent provided 3/5/2024  Chaperone: declined    ORTHO SCREEN  Posture in sitting: WNL  Posture in standing: WNL  Pelvic alignment: no sign of deviations noted in supine     ABDOMINAL WALL ASSESSMENT  Abdominal strength: Rectus abdominus: 2/5     Transverse abdominus: poorly isolated from rectus  Scarring: none  Diastasis: absent       BREATHING MECHANICS ASSESSMENT   Thorax Assessment During Quiet Respiration: WNL excursion of abdominal wall  Thorax Assessment During Deep Respiration: WNL excursion of abdominal wall    VAGINAL PELVIC FLOOR  EXAM    EXTERNAL ASSESSMENT  Introitus: stenotic  Skin condition: WNL  Scarring: none   Sensation: WNL   Pain: none    Voluntary contraction: visible lift  Voluntary relaxation: visible drop  Involuntary contraction: visible lift  Bearing down: bulge  Perineal descent: absent  Comments: (poor excursion noted)      INTERNAL ASSESSMENT  Pain: tender areas noted as follows: L LA>R; OI's painful B'ly   Sensation: able to localized pressure appropriately   Vaginal vault: stenotic   Muscle Bulk: hypertonus   Muscle Power: 2/5  Muscle Endurance: 4 sec       Quality of contraction: decreased hold and incomplete relaxation   Specificity: patient contracts: gluts   Coordination: tends to hold breath during PFM contraction   Prolapse check: none     TREATMENT     Treatment Time In: 3:45  Treatment Time Out: 3:55  Total Treatment time (time-based codes) separate from Evaluation: 10 minutes    Neuromuscular Re-education to develop Down training for 10 minutes including:   Diaphragmatic breathing in yoga postures for drop.     Patient Education provided:   general anatomy/physiology of urinary/ bowel  system, benefits of treatment, risks of treatment, and alternative methods of treatment were discussed with the pt. Additionally, anatomy/physiology of pelvic floor and posture/body mechanices were reviewed.     Home Exercises provided:  Written Home Exercises provided: yes.  Exercises were reviewed and Rea was able to demonstrate them prior to the end of the session.    Rea demonstrated good  understanding of the education provided.     See EMR under Patient Instructions for exercises provided 3/5/2024.    Assessment     Rea is a 26 y.o. female referred to outpatient Physical Therapy with a medical diagnosis of Female pelvic pain [R10.2] . Pt presents with poor knowledge of body mechanics and posture, poor trunk stability, pelvic floor tenderness, decreased pelvic muscle strength, increased tension of the pelvic muscles, poor  quality of pelvic muscle contraction, and dysfunctional voiding.       Pt prognosis is Good.   Pt will benefit from skilled outpatient Physical Therapy to address the deficits stated above and in the chart below, provide pt/family education, and to maximize pt's level of independence.     Plan of care discussed with patient: Yes  Pt's spiritual, cultural and educational needs considered and patient is agreeable to the plan of care and goals as stated below:     Anticipated Barriers for therapy: none    Medical Necessity is demonstrated by the following:    History  Co-morbidities and personal factors that may impact the plan of care Co-morbidities   PCOS    Personal Factors  no deficits     moderate   Examination  Body structures and functions, activity limitations and participation restrictions that may impact the plan of care Body Regions/Systems/Functions:  poor knowledge of body mechanics and posture, poor trunk stability, pelvic floor tenderness, decreased pelvic muscle strength, increased tension of the pelvic muscles, poor quality of pelvic muscle contraction, and dysfunctional voiding     Activity Limitations:  full bladder emptying, intercourse/vaginal exam/tampon use without pain, sleep uninterrupted by excessive nocturia, and Pain with ADLs    Participation Restrictions:  ADLs affected by inability to fully empty bladder , relationship with spouse/partner, ADL participation affected by pain, and Sleep restrictions    Activity limitations:   Learning and applying knowledge  None    General Tasks and Commands  None    Communication  None    Mobility  None    Self care  None    Domestic Life  None    Interactions/Relationships  None    Life Areas  None    Community and Social Life  None       moderate   Clinical Presentation stable and uncomplicated low   Decision Making/ Complexity Score: low       Goals:  Short Term Goals: 2 weeks  Pt to report increased awareness of PFM lift/drop during exercises and  functional activities.  Pt to be I with double voiding techniques.  Pt to be I with diaphragmatic breathing.      Long Term Goals: 12 weeks   Pt will report improved ability to sleep uninterrupted by excessive nocturia 5/7 days per week.   Pt will urinate without crede/Valsalva to prevent adverse effects to adjacent structures.  Pt will report successfully having intercourse with < or = 2/10 pain for an improvement in activity tolerance.   Pt will report successful tampon use with < or = 2/10 pain for an improvement in activity tolerance.  Pt/family will be independent with HEP for continued self-management of symptoms.     Plan     Plan of care Certification: 3/5/2024 to 6/5/2024.    Outpatient Physical Therapy 1 times per 2 week(s)  for 12 weeks to include the following interventions: therapeutic exercises, therapeutic activity, neuromuscular re-education, manual therapy, modalities PRN, patient/family education, and self care/home management    Malgorzata Ramirez, PT, BCB-PMD

## 2024-03-06 ENCOUNTER — APPOINTMENT (OUTPATIENT)
Dept: MEDICAL GROUP | Facility: PHYSICIAN GROUP | Age: 59
End: 2024-03-06
Payer: COMMERCIAL

## 2024-03-12 ENCOUNTER — OFFICE VISIT (OUTPATIENT)
Dept: MEDICAL GROUP | Facility: PHYSICIAN GROUP | Age: 59
End: 2024-03-12
Payer: COMMERCIAL

## 2024-03-12 VITALS
BODY MASS INDEX: 31.57 KG/M2 | RESPIRATION RATE: 15 BRPM | OXYGEN SATURATION: 99 % | HEIGHT: 74 IN | SYSTOLIC BLOOD PRESSURE: 116 MMHG | TEMPERATURE: 97.8 F | HEART RATE: 74 BPM | DIASTOLIC BLOOD PRESSURE: 68 MMHG | WEIGHT: 246 LBS

## 2024-03-12 DIAGNOSIS — R73.03 PREDIABETES: ICD-10-CM

## 2024-03-12 DIAGNOSIS — E11.22 TYPE 2 DIABETES MELLITUS WITH STAGE 3A CHRONIC KIDNEY DISEASE, WITHOUT LONG-TERM CURRENT USE OF INSULIN (HCC): ICD-10-CM

## 2024-03-12 DIAGNOSIS — N18.31 TYPE 2 DIABETES MELLITUS WITH STAGE 3A CHRONIC KIDNEY DISEASE, WITHOUT LONG-TERM CURRENT USE OF INSULIN (HCC): ICD-10-CM

## 2024-03-12 DIAGNOSIS — M79.641 PAIN OF RIGHT HAND: ICD-10-CM

## 2024-03-12 PROBLEM — M79.643 HAND PAIN: Status: ACTIVE | Noted: 2024-03-12

## 2024-03-12 PROCEDURE — 3074F SYST BP LT 130 MM HG: CPT

## 2024-03-12 PROCEDURE — 99214 OFFICE O/P EST MOD 30 MIN: CPT

## 2024-03-12 PROCEDURE — 3078F DIAST BP <80 MM HG: CPT

## 2024-03-12 RX ORDER — MELOXICAM 15 MG/1
15 TABLET ORAL DAILY
Qty: 60 TABLET | Refills: 1 | Status: SHIPPED | OUTPATIENT
Start: 2024-03-12

## 2024-03-12 ASSESSMENT — FIBROSIS 4 INDEX: FIB4 SCORE: 1.02

## 2024-03-12 NOTE — ASSESSMENT & PLAN NOTE
"Chronic issue, new to me, uncertain diagnosis.  Ongoing for many months. Reports that he broke a bone in his right wrist about 15 years ago, no surgical intervention, casted at the time. Had intermittent pain over the last 15 years, associated with weather, and then pain became constant a couple of months ago. Describes as a \"rumble\" of a pain, worsened with picking items up or writing. Nothing makes pain better, has tried a brace and rest. Pain does respond to ibuprofen. Does feel that the thumb is much weaker.     Plan:    Treat with long-acting anti-inflammatories, meloxicam sent in as below.  Obtain updated x-ray due to prior fracture and refer to [facility] for further evaluation. No neurovascular compromise.   "

## 2024-03-12 NOTE — PROGRESS NOTES
"CC:   Chief Complaint   Patient presents with    Hand Pain     Pt states hand has been hurting for a while and wont get better        HPI: Patient is a 58 y.o. male presenting to discuss the following:    Subjective & Assessment/Plan:    Problem List Items Addressed This Visit       Prediabetes     This is a chronic, stable medical condition.   Declines monofilament exam, urine screen and retinopathy referral as A1c is well controlled at 5.1% and was transiently high at 6.7% temporarily with significant reduction after. Managed with continued dietary and lifestyle efforts.          Hand pain     Chronic issue, new to me.   Ongoing for many months. Reports that he broke a bone in his right wrist about 15 years ago, no surgical intervention, casted at the time. Had intermittent pain over the last 15 years, associated with weather, and then pain became constant a couple of months ago. Describes as a \"rumble\" of a pain, worsened with picking items up or writing. Nothing makes pain better, has tried a brace and rest. Pain does respond to ibuprofen. Does feel that the thumb is much weaker.     Plan  COVID-19 positive test (U07.1, COVID-19) with Acute Respiratory Distress Syndrome (ARDS) (J80, ARDS)  (If respiratory failure or sepsis present, add as separate assessment)    Treat with long-acting anti-inflammatories, meloxicam sent in as below.  Obtain updated x-ray due to prior fracture and refer to [facility] for further evaluation         Relevant Medications    meloxicam (MOBIC) 15 MG tablet    Other Relevant Orders    DX-HAND 3+ RIGHT    Referral to Orthopedics     Other Visit Diagnoses       Type 2 diabetes mellitus with stage 3a chronic kidney disease, without long-term current use of insulin ([facility])                Patient Active Problem List    Diagnosis Date Noted    Hand pain 03/12/2024    Chronic pansinusitis 06/08/2023    Hypertrophy of nasal turbinates 06/08/2023    Systolic murmur 08/17/2022    Prediabetes 07/28/2021 " "   Viral warts 08/27/2020    Recurrent oral herpes simplex 08/13/2020    Hypertriglyceridemia 10/29/2019    GERD (gastroesophageal reflux disease) 10/29/2019    Leg edema, right 10/29/2019    Chronic pain of right knee 10/29/2019    Obesity (BMI 30-39.9) 10/29/2019    Allergic rhinitis, seasonal 10/29/2019       Current Outpatient Medications   Medication Sig Dispense Refill    meloxicam (MOBIC) 15 MG tablet Take 1 Tablet by mouth every day. 60 Tablet 1    omeprazole (PRILOSEC) 20 MG delayed-release capsule Take 1 Capsule by mouth every day. 90 Capsule 3    valacyclovir (VALTREX) 1 GM Tab Take 1 Tablet by mouth every day. 90 Tablet 2    azelastine (ASTELIN) 137 MCG/SPRAY nasal spray Administer 2 Sprays into affected nostril(S) 2 times a day. 120 mL 0    Turmeric (QC TUMERIC COMPLEX PO) Take 2 Capsules by mouth every day.      APPLE CIDER VINEGAR PO Take 1 Tablet by mouth every day.      fenofibrate (TRICOR) 145 MG Tab Take 1 Tablet by mouth every day. (Patient not taking: Reported on 3/12/2024) 90 Tablet 0    triamterene-hctz (MAXZIDE-25/DYAZIDE) 37.5-25 MG Tab Take 1 Tablet by mouth every 48 hours. (Patient not taking: Reported on 3/12/2024) 45 Tablet 3     No current facility-administered medications for this visit.       Allergies as of 03/12/2024 - Reviewed 03/12/2024   Allergen Reaction Noted    Oxycodone Itching 02/06/2015       Health Maintenance: Outstanding health maintenance items were ordered if applicable to patient including screening and preventative measures.     Review of Systems: Otherwise negative except for as stated above.      Objective:   /68   Pulse 74   Temp 36.6 °C (97.8 °F) (Temporal)   Resp 15   Ht 1.88 m (6' 2\")   Wt 112 kg (246 lb)   SpO2 99%  Body mass index is 31.58 kg/m².  Vital signs reviewed  Physical Exam  Physical Exam  Musculoskeletal:      Right hand: Tenderness present. Decreased strength of wrist extension. Normal sensation. Normal capillary refill.      Comments: " Tenderness over the base of the first digit       Constitutional:       Appearance: Normal appearance.   Eyes:      Extraocular Movements: Extraocular movements intact.   Pulmonary:      Effort: Pulmonary effort is normal.   Neurological:      General: No focal deficit present.      Mental Status: She is alert and oriented to person, place, and time.   Psychiatric:         Mood and Affect: Mood normal.         Behavior: Behavior normal.       Follow-up: Return in about 3 months (around 6/12/2024) for lab follow up.    Please note that this dictation was created using voice recognition software. I have made every reasonable attempt to correct obvious errors, but I expect that there are errors of grammar and possibly content that I did not discover before finalizing the note.    Electronically signed by MERCEDES Gaston on March 12, 2024

## 2024-03-12 NOTE — ASSESSMENT & PLAN NOTE
This is a chronic, stable medical condition.   Declines monofilament exam, urine screen and retinopathy referral as A1c is well controlled at 5.1% and was transiently high at 6.7% temporarily with significant reduction after. Managed with continued dietary and lifestyle efforts.

## 2024-04-25 DIAGNOSIS — M79.641 PAIN OF RIGHT HAND: ICD-10-CM

## 2024-04-25 RX ORDER — MELOXICAM 15 MG/1
15 TABLET ORAL DAILY
Qty: 60 TABLET | Refills: 1 | Status: SHIPPED | OUTPATIENT
Start: 2024-04-25

## 2024-04-25 NOTE — TELEPHONE ENCOUNTER
Received request via: Pharmacy    Was the patient seen in the last year in this department? Yes    Does the patient have an active prescription (recently filled or refills available) for medication(s) requested? No    Pharmacy Name:   [facility] - [address] [phone]               Does the patient have correction Plus and need 100 day supply (blood pressure, diabetes and cholesterol meds only)? Patient does not have SCP

## 2024-07-02 DIAGNOSIS — M79.641 PAIN OF RIGHT HAND: ICD-10-CM

## 2024-07-08 ENCOUNTER — HOSPITAL ENCOUNTER (OUTPATIENT)
Dept: LAB | Facility: MEDICAL CENTER | Age: 59
End: 2024-07-08
Payer: COMMERCIAL

## 2024-07-08 DIAGNOSIS — R73.03 PREDIABETES: ICD-10-CM

## 2024-07-08 LAB
CHOLEST SERPL-MCNC: 179 MG/DL (ref 100–199)
EST. AVERAGE GLUCOSE BLD GHB EST-MCNC: 100 MG/DL
HBA1C MFR BLD: 5.1 % (ref 4–5.6)
HDLC SERPL-MCNC: 39 MG/DL
LDLC SERPL CALC-MCNC: 103 MG/DL
TRIGL SERPL-MCNC: 184 MG/DL (ref 0–149)

## 2024-07-08 PROCEDURE — 83036 HEMOGLOBIN GLYCOSYLATED A1C: CPT

## 2024-07-08 PROCEDURE — 36415 COLL VENOUS BLD VENIPUNCTURE: CPT

## 2024-07-08 PROCEDURE — 80061 LIPID PANEL: CPT

## 2024-07-08 RX ORDER — MELOXICAM 15 MG/1
15 TABLET ORAL DAILY
Qty: 90 TABLET | Refills: 3 | Status: SHIPPED | OUTPATIENT
Start: 2024-07-08 | End: 2024-07-10

## 2024-07-10 ENCOUNTER — OFFICE VISIT (OUTPATIENT)
Dept: MEDICAL GROUP | Facility: PHYSICIAN GROUP | Age: 59
End: 2024-07-10
Payer: COMMERCIAL

## 2024-07-10 VITALS
HEIGHT: 74 IN | SYSTOLIC BLOOD PRESSURE: 110 MMHG | HEART RATE: 81 BPM | RESPIRATION RATE: 16 BRPM | BODY MASS INDEX: 29.82 KG/M2 | DIASTOLIC BLOOD PRESSURE: 64 MMHG | TEMPERATURE: 97.6 F | WEIGHT: 232.4 LBS | OXYGEN SATURATION: 95 %

## 2024-07-10 DIAGNOSIS — R73.03 PREDIABETES: ICD-10-CM

## 2024-07-10 DIAGNOSIS — Z12.5 ENCOUNTER FOR PROSTATE CANCER SCREENING: ICD-10-CM

## 2024-07-10 DIAGNOSIS — E78.1 HYPERTRIGLYCERIDEMIA: ICD-10-CM

## 2024-07-10 DIAGNOSIS — K21.9 GASTROESOPHAGEAL REFLUX DISEASE, UNSPECIFIED WHETHER ESOPHAGITIS PRESENT: ICD-10-CM

## 2024-07-10 PROCEDURE — 3074F SYST BP LT 130 MM HG: CPT

## 2024-07-10 PROCEDURE — 3078F DIAST BP <80 MM HG: CPT

## 2024-07-10 PROCEDURE — 99214 OFFICE O/P EST MOD 30 MIN: CPT

## 2024-07-10 RX ORDER — OMEPRAZOLE 20 MG/1
20 CAPSULE, DELAYED RELEASE ORAL DAILY
Qty: 90 CAPSULE | Refills: 3 | Status: SHIPPED | OUTPATIENT
Start: 2024-07-10

## 2024-07-10 ASSESSMENT — FIBROSIS 4 INDEX: FIB4 SCORE: 1.041666666666666667

## 2024-12-29 DIAGNOSIS — J30.2 SEASONAL ALLERGIC RHINITIS, UNSPECIFIED TRIGGER: ICD-10-CM

## 2024-12-30 RX ORDER — AZELASTINE HYDROCHLORIDE 137 UG/1
SPRAY, METERED NASAL
Qty: 120 ML | Refills: 0 | Status: SHIPPED | OUTPATIENT
Start: 2024-12-30

## 2024-12-30 NOTE — TELEPHONE ENCOUNTER
Received request via: Pharmacy    Was the patient seen in the last year in this department? Yes    Does the patient have an active prescription (recently filled or refills available) for medication(s) requested? No    Pharmacy Name: [facility]    Does the patient have USP Plus and need 100-day supply? (This applies to ALL medications) Patient does not have SCP

## 2025-02-05 ENCOUNTER — HOSPITAL ENCOUNTER (OUTPATIENT)
Dept: LAB | Facility: MEDICAL CENTER | Age: 60
End: 2025-02-05
Payer: COMMERCIAL

## 2025-02-05 DIAGNOSIS — Z12.5 ENCOUNTER FOR PROSTATE CANCER SCREENING: ICD-10-CM

## 2025-02-05 DIAGNOSIS — E78.1 HYPERTRIGLYCERIDEMIA: ICD-10-CM

## 2025-02-05 DIAGNOSIS — R73.03 PREDIABETES: ICD-10-CM

## 2025-02-05 LAB
ALBUMIN SERPL BCP-MCNC: 4.5 G/DL (ref 3.2–4.9)
ALBUMIN/GLOB SERPL: 1.5 G/DL
ALP SERPL-CCNC: 90 U/L (ref 30–99)
ALT SERPL-CCNC: 35 U/L (ref 2–50)
ANION GAP SERPL CALC-SCNC: 14 MMOL/L (ref 7–16)
AST SERPL-CCNC: 27 U/L (ref 12–45)
BILIRUB SERPL-MCNC: 0.5 MG/DL (ref 0.1–1.5)
BUN SERPL-MCNC: 18 MG/DL (ref 8–22)
CALCIUM ALBUM COR SERPL-MCNC: 9.7 MG/DL (ref 8.5–10.5)
CALCIUM SERPL-MCNC: 10.1 MG/DL (ref 8.5–10.5)
CHLORIDE SERPL-SCNC: 105 MMOL/L (ref 96–112)
CHOLEST SERPL-MCNC: 195 MG/DL (ref 100–199)
CO2 SERPL-SCNC: 22 MMOL/L (ref 20–33)
CREAT SERPL-MCNC: 0.95 MG/DL (ref 0.5–1.4)
FASTING STATUS PATIENT QL REPORTED: NORMAL
GFR SERPLBLD CREATININE-BSD FMLA CKD-EPI: 91 ML/MIN/1.73 M 2
GLOBULIN SER CALC-MCNC: 3 G/DL (ref 1.9–3.5)
GLUCOSE SERPL-MCNC: 78 MG/DL (ref 65–99)
HDLC SERPL-MCNC: 36 MG/DL
LDLC SERPL CALC-MCNC: 122 MG/DL
POTASSIUM SERPL-SCNC: 3.9 MMOL/L (ref 3.6–5.5)
PROT SERPL-MCNC: 7.5 G/DL (ref 6–8.2)
PSA SERPL DL<=0.01 NG/ML-MCNC: 0.7 NG/ML (ref 0–4)
SODIUM SERPL-SCNC: 141 MMOL/L (ref 135–145)
TRIGL SERPL-MCNC: 186 MG/DL (ref 0–149)

## 2025-02-05 PROCEDURE — 80061 LIPID PANEL: CPT

## 2025-02-05 PROCEDURE — 83036 HEMOGLOBIN GLYCOSYLATED A1C: CPT

## 2025-02-05 PROCEDURE — 80053 COMPREHEN METABOLIC PANEL: CPT

## 2025-02-05 PROCEDURE — 36415 COLL VENOUS BLD VENIPUNCTURE: CPT

## 2025-02-05 PROCEDURE — 84153 ASSAY OF PSA TOTAL: CPT

## 2025-02-07 LAB
EST. AVERAGE GLUCOSE BLD GHB EST-MCNC: 114 MG/DL
HBA1C MFR BLD: 5.6 % (ref 4–5.6)

## 2025-02-10 ENCOUNTER — OFFICE VISIT (OUTPATIENT)
Dept: MEDICAL GROUP | Facility: PHYSICIAN GROUP | Age: 60
End: 2025-02-10
Payer: COMMERCIAL

## 2025-02-10 VITALS
TEMPERATURE: 97.7 F | SYSTOLIC BLOOD PRESSURE: 116 MMHG | BODY MASS INDEX: 29.52 KG/M2 | DIASTOLIC BLOOD PRESSURE: 60 MMHG | HEIGHT: 74 IN | HEART RATE: 67 BPM | OXYGEN SATURATION: 97 % | WEIGHT: 230 LBS | RESPIRATION RATE: 18 BRPM

## 2025-02-10 DIAGNOSIS — Z86.39 HISTORY OF DIABETES MELLITUS, TYPE II: ICD-10-CM

## 2025-02-10 DIAGNOSIS — R73.03 PREDIABETES: ICD-10-CM

## 2025-02-10 DIAGNOSIS — J34.3 HYPERTROPHY OF NASAL TURBINATES: ICD-10-CM

## 2025-02-10 DIAGNOSIS — M79.644 CHRONIC PAIN OF RIGHT THUMB: ICD-10-CM

## 2025-02-10 DIAGNOSIS — E78.1 HYPERTRIGLYCERIDEMIA: ICD-10-CM

## 2025-02-10 DIAGNOSIS — G89.29 CHRONIC PAIN OF RIGHT THUMB: ICD-10-CM

## 2025-02-10 PROCEDURE — 99214 OFFICE O/P EST MOD 30 MIN: CPT

## 2025-02-10 PROCEDURE — 3074F SYST BP LT 130 MM HG: CPT

## 2025-02-10 PROCEDURE — 3078F DIAST BP <80 MM HG: CPT

## 2025-02-10 ASSESSMENT — PATIENT HEALTH QUESTIONNAIRE - PHQ9: CLINICAL INTERPRETATION OF PHQ2 SCORE: 0

## 2025-02-10 ASSESSMENT — FIBROSIS 4 INDEX: FIB4 SCORE: 0.95

## 2025-02-13 NOTE — PROGRESS NOTES
Verbal consent was acquired by the patient to use [employer] ambient listening note generation during this visit     Subjective:     HPI:   History of Present Illness  The patient is a 59-year-old male presenting for follow-up on laboratory results, hand arthralgia, and nasal obstruction.    He has persistent hypertriglyceridemia, with triglyceride levels at 186 mg/dL, consistent with the previous measurement of 184 mg/dL. Low-density lipoprotein (LDL) cholesterol levels have shown a slight increase, although total cholesterol remains below 200 mg/dL. The patient reports a recent weight gain of 9 pounds, bringing his current weight to 230 pounds. He adheres to a low-carbohydrate diet and typically engages in cycling for 12 miles daily, though recent inclement weather has limited his physical activity. His alcohol consumption includes light beer, averaging less than one per day, with increased intake on weekends. He recalls experiencing a severe adverse reaction to niacin in the past.    The patient was previously diagnosed with arthritis at [facility] for hand pain, although he expresses skepticism due to a lack of personal history of arthritis. A joint injection provided no symptomatic relief. The pain, which he likens to a previous wrist fracture, is severe enough to impede his ability to lift objects. He reports an absence of numbness or tingling and is uncertain about the presence of swelling.    He consulted with Dr. Koch, an otolaryngologist, who performed a balloon dilation procedure on his nasal passages. He notes that one side appears to be obstructing again, prompting a follow-up consultation. He is uncertain if a referral is required.    Renal function has improved, with the glomerular filtration rate (GFR) increasing from 73 to 91 mL/min/1.73 m². Hemoglobin A1c is stable at 5.6%, up from a previous value of 5.1%. The patient has received conflicting reports regarding his A1c and glucose levels and seeks  clarification.    SOCIAL HISTORY  Consumes light beer, less than one per day, more on weekends.    FAMILY HISTORY  No known family history of cardiovascular disease or low HDL cholesterol.    ALLERGIES  Adverse reaction to niacin.    MEDICATIONS  Past: Niacin    HCC Gap Form    Diagnosis to address: Z86.39 - History of diabetes mellitus, type II  Assessment and plan: Chronic, stable. Continue with current defined treatment plan: Monitor A1c every 6 months. Follow-up at least annually.  Last edited 02/12/25 21:15 PST by HANNAH Arevalo         Assessment & Plan:     Problem List Items Addressed This Visit       Hypertriglyceridemia    Relevant Orders    Comp Metabolic Panel    Lipid Profile    Prediabetes    Relevant Orders    HEMOGLOBIN A1C    Comp Metabolic Panel    Hypertrophy of nasal turbinates    Relevant Orders    Referral to ENT    History of diabetes mellitus, type II     Other Visit Diagnoses         Chronic pain of right thumb        Relevant Orders    Referral to Orthopedics              Assessment & Plan  1. Hypertriglyceridemia: Stable. Triglycerides at 186, consistent with previous readings of 184. LDL levels increased slightly, total cholesterol under 200.  - Advise reducing beer consumption  - Increase physical activity  - Repeat lipid panel in 6 months    2. Hand pain: Unresponsive to steroid injection.  - Refer to Dr. Wagner for a second opinion    3. Nasal obstruction with hx of nasal turbinate hypertrophy  - Refer to ENTfor further evaluation    4. Hx of DM II in remission: Stable. Hemoglobin A1c at 5.6, up from 5.1. Fasting glucose recently 114.  - Advise continued blood glucose monitoring  - Balanced diet  - Recheck A1c in 6 months    Follow-up  - Recheck labs in 6 months  - Referral to Dr. Wagner for hand pain  - Referral to Dr. Koch for nasal obstruction    PROCEDURE  Previously received a joint injection for hand pain, which did not alleviate symptoms. Underwent  balloon dilation on nose by ENT      Health Maintenance: Orders placed as applicable to patient     Objective:     Exam:  Objective:  Vitals:    02/10/25 1344   BP: 116/60   Pulse: 67   Resp: 18   Temp: 36.5 °C (97.7 °F)   SpO2: 97%     Physical Exam  Physical Exam    Constitutional:       Appearance: Normal appearance.   Eyes:      Extraocular Movements: Extraocular movements intact.   Pulmonary:      Effort: Pulmonary effort is normal.   Neurological:      General: No focal deficit present.      Mental Status: She is alert and oriented to person, place, and time.   Psychiatric:         Mood and Affect: Mood normal.         Behavior: Behavior normal.       Return in about 6 months (around 8/10/2025) for lab follow up.    Please note that this dictation was created using voice recognition software. I have made every reasonable attempt to correct obvious errors, but I expect that there are errors of grammar and possibly content that I did not discover before finalizing the note.

## 2025-02-27 NOTE — Clinical Note
Member Name: Kofi Wall   Member Number: [id number]   Reference Number: [id number]   Approved Services: ENT Services   Approved Service Dates: 02/27/2025 - 05/28/2025   Requesting Provider: Yael Figueroa   Requested Provider: Yael Figueroa     Dear Kofi Wall:     The following medical service(s) requested by Yael Figueroa have been approved:    Procedure Code Procedure Code Name Approved Quantity Status   31613 (CPT®) [place] CT SCAN,MAXILLOFACIAL AREA,W/O CONTRAST 1 Authorized       The services should be provided by Yael Figueroa no later than 05/28/2025. Please contact the provider listed below with any questions.     Provider Information:  Yael Figueroa  [phone]    Your plan benefit may require a deductible, co-payment or coinsurance for these services. This authorization does not guarantee [facility] will pay the claim for services that you receive. Payment by [employer] for these services is subject to the terms of your Evidence of Coverage or Summary Plan Description, your eligibility at the time of service, and confirmation of benefit coverage.    For any questions or additional information, please contact Customer Service:    [employer]  Customer Service: [phone] or toll free [phone]  TTY users dial: [phone]   Call Center Hours: Mon - Fri 7 AM to 8 PM PST   Office Hours: Mon - Fri 8 AM to 5 PM PST   E-mail: [email or web address]   Website: [email or web address]       This information is available for free in other languages. Please contact Customer Service at the phone number above for more information. [employer] complies with applicable Federal civil rights laws and does not discriminate on the basis of race, color, national origin, age, disability or sex.      Sincerely,     Healthcare Utilization Management Department     Cc: Yael Figueroa

## 2025-03-06 DIAGNOSIS — B00.2 RECURRENT ORAL HERPES SIMPLEX: ICD-10-CM

## 2025-03-06 NOTE — TELEPHONE ENCOUNTER
Received request via: Pharmacy    Was the patient seen in the last year in this department? Yes    Does the patient have an active prescription (recently filled or refills available) for medication(s) requested? No    Pharmacy Name:   [facility] - [address] (Pharmacy) [phone]       Does the patient have shelter Plus and need 100-day supply? (This applies to ALL medications) Patient does not have SCP

## 2025-03-13 RX ORDER — VALACYCLOVIR HYDROCHLORIDE 1 G/1
1000 TABLET, FILM COATED ORAL DAILY
Qty: 90 TABLET | Refills: 3 | Status: SHIPPED | OUTPATIENT
Start: 2025-03-13

## 2025-05-15 DIAGNOSIS — K21.9 GASTROESOPHAGEAL REFLUX DISEASE, UNSPECIFIED WHETHER ESOPHAGITIS PRESENT: ICD-10-CM

## 2025-05-19 RX ORDER — OMEPRAZOLE 20 MG/1
20 CAPSULE, DELAYED RELEASE ORAL DAILY
Qty: 90 CAPSULE | Refills: 3 | Status: SHIPPED | OUTPATIENT
Start: 2025-05-19

## 2025-05-19 NOTE — TELEPHONE ENCOUNTER
Received request via: Patient    Was the patient seen in the last year in this department? Yes    Does the patient have an active prescription (recently filled or refills available) for medication(s) requested? No    [facility] - [place], [place] - [address]  [address]  [facility] [address]  [facility] [address]  Phone: [phone] Fax: [phone]        Does the patient have prison Plus and need 100-day supply? (This applies to ALL medications) Patient does not have SCP

## 2025-08-08 ENCOUNTER — HOSPITAL ENCOUNTER (OUTPATIENT)
Dept: LAB | Facility: MEDICAL CENTER | Age: 60
End: 2025-08-08
Payer: COMMERCIAL

## 2025-08-08 DIAGNOSIS — E78.1 HYPERTRIGLYCERIDEMIA: ICD-10-CM

## 2025-08-08 DIAGNOSIS — R73.03 PREDIABETES: ICD-10-CM

## 2025-08-08 LAB
ALBUMIN SERPL BCP-MCNC: 4.6 G/DL (ref 3.2–4.9)
ALBUMIN/GLOB SERPL: 1.5 G/DL
ALP SERPL-CCNC: 88 U/L (ref 30–99)
ALT SERPL-CCNC: 38 U/L (ref 2–50)
ANION GAP SERPL CALC-SCNC: 12 MMOL/L (ref 7–16)
AST SERPL-CCNC: 24 U/L (ref 12–45)
BILIRUB SERPL-MCNC: 0.4 MG/DL (ref 0.1–1.5)
BUN SERPL-MCNC: 19 MG/DL (ref 8–22)
CALCIUM ALBUM COR SERPL-MCNC: 9.7 MG/DL (ref 8.5–10.5)
CALCIUM SERPL-MCNC: 10.2 MG/DL (ref 8.5–10.5)
CHLORIDE SERPL-SCNC: 103 MMOL/L (ref 96–112)
CHOLEST SERPL-MCNC: 201 MG/DL (ref 100–199)
CO2 SERPL-SCNC: 24 MMOL/L (ref 20–33)
CREAT SERPL-MCNC: 0.95 MG/DL (ref 0.5–1.4)
EST. AVERAGE GLUCOSE BLD GHB EST-MCNC: 97 MG/DL
FASTING STATUS PATIENT QL REPORTED: NORMAL
GFR SERPLBLD CREATININE-BSD FMLA CKD-EPI: 91 ML/MIN/1.73 M 2
GLOBULIN SER CALC-MCNC: 3 G/DL (ref 1.9–3.5)
GLUCOSE SERPL-MCNC: 84 MG/DL (ref 65–99)
HBA1C MFR BLD: 5 % (ref 4–5.6)
HDLC SERPL-MCNC: 45 MG/DL
LDLC SERPL CALC-MCNC: 130 MG/DL
POTASSIUM SERPL-SCNC: 4.4 MMOL/L (ref 3.6–5.5)
PROT SERPL-MCNC: 7.6 G/DL (ref 6–8.2)
SODIUM SERPL-SCNC: 139 MMOL/L (ref 135–145)
TRIGL SERPL-MCNC: 130 MG/DL (ref 0–149)

## 2025-08-08 PROCEDURE — 80061 LIPID PANEL: CPT

## 2025-08-08 PROCEDURE — 83036 HEMOGLOBIN GLYCOSYLATED A1C: CPT

## 2025-08-08 PROCEDURE — 36415 COLL VENOUS BLD VENIPUNCTURE: CPT

## 2025-08-08 PROCEDURE — 80053 COMPREHEN METABOLIC PANEL: CPT

## 2025-08-18 ENCOUNTER — OFFICE VISIT (OUTPATIENT)
Dept: MEDICAL GROUP | Facility: PHYSICIAN GROUP | Age: 60
End: 2025-08-18
Payer: COMMERCIAL

## 2025-08-18 VITALS
SYSTOLIC BLOOD PRESSURE: 116 MMHG | TEMPERATURE: 98 F | RESPIRATION RATE: 18 BRPM | DIASTOLIC BLOOD PRESSURE: 66 MMHG | BODY MASS INDEX: 27.98 KG/M2 | WEIGHT: 218 LBS | HEIGHT: 74 IN | HEART RATE: 84 BPM | OXYGEN SATURATION: 96 %

## 2025-08-18 DIAGNOSIS — Z86.39 HISTORY OF DIABETES MELLITUS, TYPE II: Primary | ICD-10-CM

## 2025-08-18 DIAGNOSIS — Z12.5 ENCOUNTER FOR PROSTATE CANCER SCREENING: ICD-10-CM

## 2025-08-18 PROCEDURE — 3078F DIAST BP <80 MM HG: CPT

## 2025-08-18 PROCEDURE — 3074F SYST BP LT 130 MM HG: CPT

## 2025-08-18 PROCEDURE — 99213 OFFICE O/P EST LOW 20 MIN: CPT

## 2025-08-18 ASSESSMENT — FIBROSIS 4 INDEX: FIB4 SCORE: 0.82
